# Patient Record
Sex: FEMALE | Race: WHITE | NOT HISPANIC OR LATINO | Employment: FULL TIME | URBAN - METROPOLITAN AREA
[De-identification: names, ages, dates, MRNs, and addresses within clinical notes are randomized per-mention and may not be internally consistent; named-entity substitution may affect disease eponyms.]

---

## 2023-03-08 PROBLEM — F41.1 GENERALIZED ANXIETY DISORDER: Status: ACTIVE | Noted: 2023-03-08

## 2023-03-08 PROBLEM — R53.83 FATIGUE: Status: ACTIVE | Noted: 2023-03-08

## 2023-03-08 PROBLEM — E55.9 VITAMIN D DEFICIENCY: Status: ACTIVE | Noted: 2023-03-08

## 2023-03-16 ENCOUNTER — OFFICE VISIT (OUTPATIENT)
Dept: PRIMARY CARE | Facility: CLINIC | Age: 28
End: 2023-03-16
Payer: COMMERCIAL

## 2023-03-16 VITALS — BODY MASS INDEX: 38.16 KG/M2 | RESPIRATION RATE: 16 BRPM | WEIGHT: 205.3 LBS | TEMPERATURE: 97.9 F | HEART RATE: 80 BPM

## 2023-03-16 DIAGNOSIS — F41.1 GENERALIZED ANXIETY DISORDER: ICD-10-CM

## 2023-03-16 DIAGNOSIS — Z00.00 PHYSICAL EXAM, ANNUAL: Primary | ICD-10-CM

## 2023-03-16 PROCEDURE — 1036F TOBACCO NON-USER: CPT | Performed by: NURSE PRACTITIONER

## 2023-03-16 PROCEDURE — 99395 PREV VISIT EST AGE 18-39: CPT | Performed by: NURSE PRACTITIONER

## 2023-03-16 RX ORDER — SERTRALINE HYDROCHLORIDE 50 MG/1
100 TABLET, FILM COATED ORAL DAILY
Qty: 90 TABLET | Refills: 1
Start: 2023-03-16 | End: 2023-10-25 | Stop reason: SDUPTHER

## 2023-03-16 RX ORDER — NORGESTIMATE AND ETHINYL ESTRADIOL 7DAYSX3 28
1 KIT ORAL DAILY
Qty: 90 TABLET | Refills: 3 | Status: SHIPPED | OUTPATIENT
Start: 2023-03-16 | End: 2023-04-12

## 2023-03-16 NOTE — PROGRESS NOTES
Subjective   Patient ID: Jing Martínez is a 27 y.o. female who presents for Anxiety and Annual Exam.          Well Adult Physical   Patient here for a comprehensive physical exam.The patient reports no problems    Do you take any herbs or supplements that were not prescribed by a doctor? no   Are you taking calcium supplements? no   Are you taking aspirin daily? no     History:  LMP: No LMP recorded.  Last pap date:   Abnormal pap? no  : 0  Para: 0        Anxiety  Presents for follow-up visit. Patient reports no chest pain, decreased concentration, nausea, nervous/anxious behavior, palpitations or shortness of breath. Symptoms occur occasionally. The severity of symptoms is mild. The patient sleeps 5 hours per night. The quality of sleep is good. Nighttime awakenings: none.     Compliance with medications is %.        Review of Systems   Constitutional:  Negative for chills, fatigue and fever.   HENT:  Negative for congestion, ear pain, rhinorrhea, sinus pressure and sore throat.    Eyes:  Negative for pain, discharge and itching.   Respiratory:  Negative for cough, shortness of breath and wheezing.    Cardiovascular:  Negative for chest pain and palpitations.   Gastrointestinal:  Negative for constipation, diarrhea, nausea and vomiting.   Genitourinary:  Negative for difficulty urinating and dysuria.   Musculoskeletal:  Negative for back pain, joint swelling and myalgias.   Skin:  Negative for color change.   Neurological:  Negative for headaches.   Hematological:  Negative for adenopathy.   Psychiatric/Behavioral:  Negative for decreased concentration. The patient is not nervous/anxious.        Objective   Pulse 80   Temp 36.6 °C (97.9 °F)   Resp 16   Wt 93.1 kg (205 lb 4.8 oz)   BMI 38.16 kg/m²     Physical Exam  Constitutional:       General: She is not in acute distress.     Appearance: She is not ill-appearing.   HENT:      Head: Normocephalic and atraumatic.      Mouth/Throat:       Mouth: Mucous membranes are moist.      Pharynx: Oropharynx is clear.   Eyes:      Conjunctiva/sclera: Conjunctivae normal.      Pupils: Pupils are equal, round, and reactive to light.   Cardiovascular:      Rate and Rhythm: Normal rate and regular rhythm.      Pulses: Normal pulses.      Heart sounds: Normal heart sounds.   Pulmonary:      Effort: Pulmonary effort is normal. No respiratory distress.      Breath sounds: Normal breath sounds.   Abdominal:      General: Bowel sounds are normal.      Palpations: Abdomen is soft.      Tenderness: There is no abdominal tenderness.   Musculoskeletal:         General: No tenderness. Normal range of motion.      Cervical back: Normal range of motion and neck supple.   Skin:     General: Skin is warm and dry.   Neurological:      General: No focal deficit present.      Mental Status: She is alert and oriented to person, place, and time.   Psychiatric:         Mood and Affect: Mood normal.         Behavior: Behavior normal.         Thought Content: Thought content normal.         Judgment: Judgment normal.         Assessment/Plan   Problem List Items Addressed This Visit          Other    Generalized anxiety disorder    Relevant Medications    sertraline (Zoloft) 50 mg tablet    Other Relevant Orders    TSH with reflex to Free T4 if abnormal    CBC and Auto Differential    Comprehensive Metabolic Panel     Other Visit Diagnoses       Physical exam, annual    -  Primary    Relevant Medications    Tri Femynor 0.18/0.215/0.25 mg-35 mcg (28) tablet    Other Relevant Orders    Vitamin D, Total    Lipid Panel

## 2023-03-19 ASSESSMENT — ENCOUNTER SYMPTOMS
NAUSEA: 0
SINUS PRESSURE: 0
MYALGIAS: 0
FEVER: 0
CONSTIPATION: 0
RHINORRHEA: 0
WHEEZING: 0
CHILLS: 0
SHORTNESS OF BREATH: 0
SORE THROAT: 0
EYE ITCHING: 0
HEADACHES: 0
DYSURIA: 0
EYE DISCHARGE: 0
COLOR CHANGE: 0
JOINT SWELLING: 0
PALPITATIONS: 0
COUGH: 0
NERVOUS/ANXIOUS: 0
DIFFICULTY URINATING: 0
BACK PAIN: 0
FATIGUE: 0
DIARRHEA: 0
DECREASED CONCENTRATION: 0
VOMITING: 0
ADENOPATHY: 0
EYE PAIN: 0

## 2023-03-20 NOTE — PATIENT INSTRUCTIONS
Patient to continue medications as ordered. Have fasting labs drawn, and we will call with results when available. Follow-up in 1 year, or sooner if needed.

## 2023-04-01 ENCOUNTER — LAB (OUTPATIENT)
Dept: LAB | Facility: LAB | Age: 28
End: 2023-04-01
Payer: COMMERCIAL

## 2023-04-01 DIAGNOSIS — F41.1 GENERALIZED ANXIETY DISORDER: ICD-10-CM

## 2023-04-01 DIAGNOSIS — Z00.00 PHYSICAL EXAM, ANNUAL: ICD-10-CM

## 2023-04-01 LAB
ALANINE AMINOTRANSFERASE (SGPT) (U/L) IN SER/PLAS: 16 U/L (ref 7–45)
ALBUMIN (G/DL) IN SER/PLAS: 4.5 G/DL (ref 3.4–5)
ALKALINE PHOSPHATASE (U/L) IN SER/PLAS: 76 U/L (ref 33–110)
ANION GAP IN SER/PLAS: 14 MMOL/L (ref 10–20)
ASPARTATE AMINOTRANSFERASE (SGOT) (U/L) IN SER/PLAS: 15 U/L (ref 9–39)
BASOPHILS (10*3/UL) IN BLOOD BY AUTOMATED COUNT: 0.03 X10E9/L (ref 0–0.1)
BASOPHILS/100 LEUKOCYTES IN BLOOD BY AUTOMATED COUNT: 0.4 % (ref 0–2)
BILIRUBIN TOTAL (MG/DL) IN SER/PLAS: 0.4 MG/DL (ref 0–1.2)
CALCIDIOL (25 OH VITAMIN D3) (NG/ML) IN SER/PLAS: 32 NG/ML
CALCIUM (MG/DL) IN SER/PLAS: 9.7 MG/DL (ref 8.6–10.6)
CARBON DIOXIDE, TOTAL (MMOL/L) IN SER/PLAS: 26 MMOL/L (ref 21–32)
CHLORIDE (MMOL/L) IN SER/PLAS: 102 MMOL/L (ref 98–107)
CHOLESTEROL (MG/DL) IN SER/PLAS: 276 MG/DL (ref 0–199)
CHOLESTEROL IN HDL (MG/DL) IN SER/PLAS: 87.3 MG/DL
CHOLESTEROL/HDL RATIO: 3.2
CREATININE (MG/DL) IN SER/PLAS: 0.59 MG/DL (ref 0.5–1.05)
EOSINOPHILS (10*3/UL) IN BLOOD BY AUTOMATED COUNT: 0.17 X10E9/L (ref 0–0.7)
EOSINOPHILS/100 LEUKOCYTES IN BLOOD BY AUTOMATED COUNT: 2 % (ref 0–6)
ERYTHROCYTE DISTRIBUTION WIDTH (RATIO) BY AUTOMATED COUNT: 13.8 % (ref 11.5–14.5)
ERYTHROCYTE MEAN CORPUSCULAR HEMOGLOBIN CONCENTRATION (G/DL) BY AUTOMATED: 31.6 G/DL (ref 32–36)
ERYTHROCYTE MEAN CORPUSCULAR VOLUME (FL) BY AUTOMATED COUNT: 80 FL (ref 80–100)
ERYTHROCYTES (10*6/UL) IN BLOOD BY AUTOMATED COUNT: 5.2 X10E12/L (ref 4–5.2)
GFR FEMALE: >90 ML/MIN/1.73M2
GLUCOSE (MG/DL) IN SER/PLAS: 92 MG/DL (ref 74–99)
HEMATOCRIT (%) IN BLOOD BY AUTOMATED COUNT: 41.8 % (ref 36–46)
HEMOGLOBIN (G/DL) IN BLOOD: 13.2 G/DL (ref 12–16)
IMMATURE GRANULOCYTES/100 LEUKOCYTES IN BLOOD BY AUTOMATED COUNT: 0.1 % (ref 0–0.9)
LDL: 171 MG/DL (ref 0–99)
LEUKOCYTES (10*3/UL) IN BLOOD BY AUTOMATED COUNT: 8.4 X10E9/L (ref 4.4–11.3)
LYMPHOCYTES (10*3/UL) IN BLOOD BY AUTOMATED COUNT: 2.11 X10E9/L (ref 1.2–4.8)
LYMPHOCYTES/100 LEUKOCYTES IN BLOOD BY AUTOMATED COUNT: 25.1 % (ref 13–44)
MONOCYTES (10*3/UL) IN BLOOD BY AUTOMATED COUNT: 0.49 X10E9/L (ref 0.1–1)
MONOCYTES/100 LEUKOCYTES IN BLOOD BY AUTOMATED COUNT: 5.8 % (ref 2–10)
NEUTROPHILS (10*3/UL) IN BLOOD BY AUTOMATED COUNT: 5.58 X10E9/L (ref 1.2–7.7)
NEUTROPHILS/100 LEUKOCYTES IN BLOOD BY AUTOMATED COUNT: 66.6 % (ref 40–80)
NRBC (PER 100 WBCS) BY AUTOMATED COUNT: 0 /100 WBC (ref 0–0)
PLATELETS (10*3/UL) IN BLOOD AUTOMATED COUNT: 398 X10E9/L (ref 150–450)
POTASSIUM (MMOL/L) IN SER/PLAS: 4.3 MMOL/L (ref 3.5–5.3)
PROTEIN TOTAL: 7.1 G/DL (ref 6.4–8.2)
SODIUM (MMOL/L) IN SER/PLAS: 138 MMOL/L (ref 136–145)
THYROTROPIN (MIU/L) IN SER/PLAS BY DETECTION LIMIT <= 0.05 MIU/L: 2.18 MIU/L (ref 0.44–3.98)
TRIGLYCERIDE (MG/DL) IN SER/PLAS: 88 MG/DL (ref 0–149)
UREA NITROGEN (MG/DL) IN SER/PLAS: 11 MG/DL (ref 6–23)
VLDL: 18 MG/DL (ref 0–40)

## 2023-04-01 PROCEDURE — 80053 COMPREHEN METABOLIC PANEL: CPT

## 2023-04-01 PROCEDURE — 36415 COLL VENOUS BLD VENIPUNCTURE: CPT

## 2023-04-01 PROCEDURE — 85025 COMPLETE CBC W/AUTO DIFF WBC: CPT

## 2023-04-01 PROCEDURE — 84443 ASSAY THYROID STIM HORMONE: CPT

## 2023-04-01 PROCEDURE — 82306 VITAMIN D 25 HYDROXY: CPT

## 2023-04-01 PROCEDURE — 80061 LIPID PANEL: CPT

## 2023-04-03 DIAGNOSIS — E78.2 MIXED HYPERLIPIDEMIA: Primary | ICD-10-CM

## 2023-04-11 DIAGNOSIS — Z00.00 PHYSICAL EXAM, ANNUAL: ICD-10-CM

## 2023-04-12 RX ORDER — NORGESTIMATE AND ETHINYL ESTRADIOL 7DAYSX3 28
KIT ORAL
Qty: 28 TABLET | Refills: 11 | Status: SHIPPED | OUTPATIENT
Start: 2023-04-12 | End: 2023-08-15 | Stop reason: SDUPTHER

## 2023-08-15 DIAGNOSIS — Z00.00 PHYSICAL EXAM, ANNUAL: ICD-10-CM

## 2023-08-16 RX ORDER — NORGESTIMATE AND ETHINYL ESTRADIOL 7DAYSX3 28
1 KIT ORAL DAILY
Qty: 28 TABLET | Refills: 11 | Status: SHIPPED | OUTPATIENT
Start: 2023-08-16 | End: 2023-10-25 | Stop reason: SDUPTHER

## 2023-09-02 RX ORDER — SERTRALINE HYDROCHLORIDE 100 MG/1
1 TABLET, FILM COATED ORAL DAILY
COMMUNITY
Start: 2020-05-28 | End: 2024-01-08 | Stop reason: ALTCHOICE

## 2023-10-10 ENCOUNTER — TELEMEDICINE (OUTPATIENT)
Dept: BEHAVIORAL HEALTH | Facility: CLINIC | Age: 28
End: 2023-10-10
Payer: COMMERCIAL

## 2023-10-10 DIAGNOSIS — F41.1 GAD (GENERALIZED ANXIETY DISORDER): ICD-10-CM

## 2023-10-10 PROCEDURE — 90837 PSYTX W PT 60 MINUTES: CPT | Performed by: PSYCHOLOGIST

## 2023-10-10 NOTE — PROGRESS NOTES
Start time: 4:00p  End time: 4:55p      The patient was informed of the current need to conduct treatment via telephone or telehealth due to Covid-19 pandemic. Patient consented to the use of the platform that may not be HIPAA compliant. I have confirmed the patient's identity via the following (minimum of three) acceptable identifiers as per  Policy PH-9:   1. Last 4 of social:   2. :   3. Address:    Telephone/Televideo Informed Consent for Psychotherapy was reviewed with the patient as follows:  There are potential benefits and risks of the use of telephone or video-conferencing that differ from in-person sessions. Specifically, the telephone or televideo system we are using may not be HIPAA compliant and may present limits to patient confidentiality. Confidentiality still applies for telepsychology services, and nobody will record the session without your permission. You agree to use the telephone or video-conferencing platform selected for our virtual sessions, and I will explain how to use it.  1)             You need to use a webcam or smartphone during the session.  2)             It is important that you be in a quiet, private space that is free of distractions (including cell phone or other devices) during the session.  3)             It is important to use a secure internet connection rather than public/free Wi-Fi.  4)             It is important to be on time. If you need to cancel or change your tele-appointment, you must notify the psychologist in advance by phone or email.  5)             We need a back-up plan (e.g., phone number where you can be reached) to restart the session or to reschedule it, in the event of technical problems.  6)             We need a safety plan that includes at least one emergency contact and the closest emergency room to your location, in the event of a crisis situation.  7)             If you are not an adult, we need the permission of your parent or legal guardian  (and their contact information) for you to participate in telepsychology sessions.  Understanding and verbal agreement was attested to by the patient.      Reason for care: Anxiety  Method of therapy: Supportive  Therapy summary: Pt reported that she is planning and preparing for her wedding this weekend. She feels she is doing well with her responsibilities but is struggling with the things that are outside of her control. She processed relationship dynamics with her MOH. She detailed how their dynamics have changed over the last two years and how this is impacting her anxiety around the wedding.     She stated that her eye twitching has reduced.     She continued to process relationship dynamics within her partner's family as well and how this will impact her wedding day.     She denies SI/HI/AVH. We will follow-up in one month.   Identified goals/objectives: Stress management for anxiety around upcoming wedding and recent dynamics  Response to therapy: Engaged  Treatment plan and process: Continue with above stated tx goals; Process family and relationship dynamics; [place] setting

## 2023-10-25 DIAGNOSIS — Z00.00 PHYSICAL EXAM, ANNUAL: ICD-10-CM

## 2023-10-25 DIAGNOSIS — F41.1 GENERALIZED ANXIETY DISORDER: ICD-10-CM

## 2023-10-26 RX ORDER — SERTRALINE HYDROCHLORIDE 50 MG/1
100 TABLET, FILM COATED ORAL DAILY
Qty: 90 TABLET | Refills: 1 | Status: SHIPPED | OUTPATIENT
Start: 2023-10-26 | End: 2023-12-07

## 2023-10-26 RX ORDER — NORGESTIMATE AND ETHINYL ESTRADIOL 7DAYSX3 28
1 KIT ORAL DAILY
Qty: 84 TABLET | Refills: 3 | Status: SHIPPED | OUTPATIENT
Start: 2023-10-26

## 2023-11-13 ENCOUNTER — TELEMEDICINE (OUTPATIENT)
Dept: BEHAVIORAL HEALTH | Facility: CLINIC | Age: 28
End: 2023-11-13
Payer: COMMERCIAL

## 2023-11-13 DIAGNOSIS — F41.1 GAD (GENERALIZED ANXIETY DISORDER): ICD-10-CM

## 2023-11-13 PROCEDURE — 90837 PSYTX W PT 60 MINUTES: CPT | Performed by: PSYCHOLOGIST

## 2023-11-13 NOTE — PROGRESS NOTES
Start time: 4:00p  End time: 4:54p      The patient was informed of the current need to conduct treatment via telephone or telehealth due to Covid-19 pandemic. Patient consented to the use of the platform that may not be HIPAA compliant. I have confirmed the patient's identity via the following (minimum of three) acceptable identifiers as per  Policy PH-9:   1. Last 4 of social  2.   3. Address    Telephone/Televideo Informed Consent for Psychotherapy was reviewed with the patient as follows:  There are potential benefits and risks of the use of telephone or video-conferencing that differ from in-person sessions. Specifically, the telephone or televideo system we are using may not be HIPAA compliant and may present limits to patient confidentiality. Confidentiality still applies for telepsychology services, and nobody will record the session without your permission. You agree to use the telephone or video-conferencing platform selected for our virtual sessions, and I will explain how to use it.  1)             You need to use a webcam or smartphone during the session.  2)             It is important that you be in a quiet, private space that is free of distractions (including cell phone or other devices) during the session.  3)             It is important to use a secure internet connection rather than public/free Wi-Fi.  4)             It is important to be on time. If you need to cancel or change your tele-appointment, you must notify the psychologist in advance by phone or email.  5)             We need a back-up plan (e.g., phone number where you can be reached) to restart the session or to reschedule it, in the event of technical problems.  6)             We need a safety plan that includes at least one emergency contact and the closest emergency room to your location, in the event of a crisis situation.  7)             If you are not an adult, we need the permission of your parent or legal guardian (and  their contact information) for you to participate in telepsychology sessions.  Understanding and verbal agreement was attested to by the patient.      Reason for care: Anxiety  Method of therapy: Supportive  Therapy summary: Jing reported that she is feeling tired. She was off school due to sickness recently. She reported that she just finished the school musical and is feeling relieved that it is over. She is feeling that things are settling down at school. She discussed things that are happening at home (e.g., adopting a cat, partner's promotion).     She processed her wedding and how dynamics were between her and others. She stated that overall it was a beautiful day.     Goals: find ; talk with partner about [place] and family dynamic    She denies SI/HI/AVH. We will follow-up in one month.   Identified goals/objectives: Maintain stable mood and low anxiety through anxiety management; practice self-care   Response to therapy: Engaged  Treatment plan and process: Continue with above stated tx goals; Process family and relationship dynamics; [place] setting; continued review of anxiety management

## 2023-12-06 DIAGNOSIS — F41.1 GENERALIZED ANXIETY DISORDER: ICD-10-CM

## 2023-12-07 RX ORDER — SERTRALINE HYDROCHLORIDE 50 MG/1
100 TABLET, FILM COATED ORAL DAILY
Qty: 180 TABLET | Refills: 1 | Status: SHIPPED | OUTPATIENT
Start: 2023-12-07 | End: 2024-01-08 | Stop reason: SDUPTHER

## 2023-12-13 ENCOUNTER — TELEMEDICINE (OUTPATIENT)
Dept: BEHAVIORAL HEALTH | Facility: CLINIC | Age: 28
End: 2023-12-13
Payer: COMMERCIAL

## 2023-12-13 DIAGNOSIS — F41.1 GAD (GENERALIZED ANXIETY DISORDER): ICD-10-CM

## 2023-12-13 PROCEDURE — 90834 PSYTX W PT 45 MINUTES: CPT | Mod: AH,95 | Performed by: PSYCHOLOGIST

## 2023-12-13 NOTE — PROGRESS NOTES
Start time: 3:40p  End time: 4:21p      The patient was informed of the current need to conduct treatment via telephone or telehealth due to Covid-19 pandemic. Patient consented to the use of the platform that may not be HIPAA compliant. I have confirmed the patient's identity via the following (minimum of three) acceptable identifiers as per  Policy PH-9:   1. Last 4 of social  2.   3. Address    Telephone/Televideo Informed Consent for Psychotherapy was reviewed with the patient as follows:  There are potential benefits and risks of the use of telephone or video-conferencing that differ from in-person sessions. Specifically, the telephone or televideo system we are using may not be HIPAA compliant and may present limits to patient confidentiality. Confidentiality still applies for telepsychology services, and nobody will record the session without your permission. You agree to use the telephone or video-conferencing platform selected for our virtual sessions, and I will explain how to use it.  1)             You need to use a webcam or smartphone during the session.  2)             It is important that you be in a quiet, private space that is free of distractions (including cell phone or other devices) during the session.  3)             It is important to use a secure internet connection rather than public/free Wi-Fi.  4)             It is important to be on time. If you need to cancel or change your tele-appointment, you must notify the psychologist in advance by phone or email.  5)             We need a back-up plan (e.g., phone number where you can be reached) to restart the session or to reschedule it, in the event of technical problems.  6)             We need a safety plan that includes at least one emergency contact and the closest emergency room to your location, in the event of a crisis situation.  7)             If you are not an adult, we need the permission of your parent or legal guardian (and  their contact information) for you to participate in telepsychology sessions.  Understanding and verbal agreement was attested to by the patient.      Reason for care: Anxiety  Method of therapy: Supportive  Therapy summary: Jing processed recent stressors since our last appointment. She stated that her eye is still twitching and she has an appt with her PCP. It is not happening all day. She described it as twitching at least once per day for a few minutes. She discussed possibility of this being stress related and we reviewed stress management skills.     She discussed additional stressors related to work and home.     Goals: Increase physical activity for stress management; practice self-care; talk with partner about [place] and family dynamic    She denies SI/HI/AVH. We will follow-up in one month.   Identified goals/objectives: Maintain stable mood and low anxiety through anxiety management; practice self-care   Response to therapy: Engaged  Treatment plan and process: Continue with above stated tx goals; Process family and relationship dynamics; [place] setting; continued review of anxiety management

## 2024-01-08 ENCOUNTER — OFFICE VISIT (OUTPATIENT)
Dept: PRIMARY CARE | Facility: CLINIC | Age: 29
End: 2024-01-08
Payer: COMMERCIAL

## 2024-01-08 VITALS
HEIGHT: 62 IN | WEIGHT: 204 LBS | DIASTOLIC BLOOD PRESSURE: 72 MMHG | OXYGEN SATURATION: 100 % | HEART RATE: 100 BPM | SYSTOLIC BLOOD PRESSURE: 126 MMHG | RESPIRATION RATE: 14 BRPM | BODY MASS INDEX: 37.54 KG/M2

## 2024-01-08 DIAGNOSIS — R25.3 EYELID TWITCH: Primary | ICD-10-CM

## 2024-01-08 DIAGNOSIS — F41.1 GENERALIZED ANXIETY DISORDER: ICD-10-CM

## 2024-01-08 PROCEDURE — 1036F TOBACCO NON-USER: CPT | Performed by: NURSE PRACTITIONER

## 2024-01-08 PROCEDURE — 99213 OFFICE O/P EST LOW 20 MIN: CPT | Performed by: NURSE PRACTITIONER

## 2024-01-08 RX ORDER — SERTRALINE HYDROCHLORIDE 50 MG/1
50 TABLET, FILM COATED ORAL DAILY
Qty: 90 TABLET | Refills: 3 | Status: SHIPPED | OUTPATIENT
Start: 2024-01-08 | End: 2025-01-07

## 2024-01-08 ASSESSMENT — COLUMBIA-SUICIDE SEVERITY RATING SCALE - C-SSRS
1. IN THE PAST MONTH, HAVE YOU WISHED YOU WERE DEAD OR WISHED YOU COULD GO TO SLEEP AND NOT WAKE UP?: NO
2. HAVE YOU ACTUALLY HAD ANY THOUGHTS OF KILLING YOURSELF?: NO
6. HAVE YOU EVER DONE ANYTHING, STARTED TO DO ANYTHING, OR PREPARED TO DO ANYTHING TO END YOUR LIFE?: NO

## 2024-01-08 ASSESSMENT — PATIENT HEALTH QUESTIONNAIRE - PHQ9
1. LITTLE INTEREST OR PLEASURE IN DOING THINGS: SEVERAL DAYS
SUM OF ALL RESPONSES TO PHQ9 QUESTIONS 1 AND 2: 2
2. FEELING DOWN, DEPRESSED OR HOPELESS: SEVERAL DAYS

## 2024-01-08 NOTE — PROGRESS NOTES
"Subjective   Jing Morrison is a 28 y.o. female who presents for new concern   Left  Eye has been twitching since Oct went away in dec and came back multiple times a day   Twitching more when stressed     She is up to date with the eye doctor (last summer), and she is working with her counselor to decrease her stress level.    Eye Problem   Both eyes are affected. This is a new problem. The current episode started more than 1 month ago. The problem occurs 2 to 4 times per day. The problem has been gradually improving. There was no injury mechanism. The patient is experiencing no pain. Pertinent negatives include no eye discharge, fever, itching, nausea or vomiting. She has tried nothing for the symptoms.        Review of Systems   Constitutional:  Negative for chills, fatigue and fever.   HENT:  Negative for congestion, ear pain, rhinorrhea, sinus pressure and sore throat.    Eyes:  Negative for pain, discharge and itching.        Twitching   Respiratory:  Negative for cough, shortness of breath and wheezing.    Cardiovascular:  Negative for chest pain and palpitations.   Gastrointestinal:  Negative for constipation, diarrhea, nausea and vomiting.   Genitourinary:  Negative for difficulty urinating and dysuria.   Musculoskeletal:  Negative for back pain, joint swelling and myalgias.   Skin:  Negative for color change.   Neurological:  Negative for headaches.   Hematological:  Negative for adenopathy.   Psychiatric/Behavioral:  Negative for decreased concentration. The patient is not nervous/anxious.        Objective   /72 (BP Location: Right arm, Patient Position: Sitting)   Pulse 100   Resp 14   Ht 1.562 m (5' 1.5\")   Wt 92.5 kg (204 lb)   SpO2 100%   BMI 37.92 kg/m²     Physical Exam  Constitutional:       General: She is not in acute distress.     Appearance: She is not ill-appearing.   HENT:      Head: Normocephalic and atraumatic.      Right Ear: Tympanic membrane, ear canal and external ear normal. "      Left Ear: Tympanic membrane and ear canal normal.      Nose: Nose normal.      Mouth/Throat:      Mouth: Mucous membranes are moist.      Pharynx: Oropharynx is clear.   Eyes:      Extraocular Movements: Extraocular movements intact.      Conjunctiva/sclera: Conjunctivae normal.      Pupils: Pupils are equal, round, and reactive to light.   Cardiovascular:      Rate and Rhythm: Normal rate and regular rhythm.      Pulses: Normal pulses.      Heart sounds: Normal heart sounds.   Pulmonary:      Effort: Pulmonary effort is normal. No respiratory distress.      Breath sounds: Normal breath sounds.   Abdominal:      General: Bowel sounds are normal.      Palpations: Abdomen is soft.      Tenderness: There is no abdominal tenderness.   Musculoskeletal:         General: Normal range of motion.   Skin:     General: Skin is warm and dry.   Neurological:      General: No focal deficit present.      Mental Status: She is alert and oriented to person, place, and time.   Psychiatric:         Mood and Affect: Mood normal.         Behavior: Behavior normal.         Thought Content: Thought content normal.         Judgment: Judgment normal.         Assessment/Plan   Problem List Items Addressed This Visit       Generalized anxiety disorder    Relevant Medications    sertraline (Zoloft) 50 mg tablet    Other Relevant Orders    Vitamin B12 (Completed)    Magnesium (Completed)    Comprehensive Metabolic Panel (Completed)     Other Visit Diagnoses       Eyelid twitch    -  Primary    Relevant Orders    Vitamin B12 (Completed)    Magnesium (Completed)    Comprehensive Metabolic Panel (Completed)          Patient Instructions   Patient to continue medications as ordered. Have fasting labs drawn, and we will call with results when available. Encouraged to see the eye doctor if symptoms don't continue to improve. Follow-up for physical, and call the office if any problems or concerns in the meantime.

## 2024-01-13 ENCOUNTER — LAB (OUTPATIENT)
Dept: LAB | Facility: LAB | Age: 29
End: 2024-01-13
Payer: COMMERCIAL

## 2024-01-13 DIAGNOSIS — E78.2 MIXED HYPERLIPIDEMIA: ICD-10-CM

## 2024-01-13 DIAGNOSIS — F41.1 GENERALIZED ANXIETY DISORDER: ICD-10-CM

## 2024-01-13 DIAGNOSIS — R25.3 EYELID TWITCH: ICD-10-CM

## 2024-01-13 LAB
ALBUMIN SERPL-MCNC: 4.4 G/DL (ref 3.5–5)
ALP BLD-CCNC: 81 U/L (ref 35–125)
ALT SERPL-CCNC: 12 U/L (ref 5–40)
ANION GAP SERPL CALC-SCNC: 14 MMOL/L
AST SERPL-CCNC: 12 U/L (ref 5–40)
BILIRUB SERPL-MCNC: <0.2 MG/DL (ref 0.1–1.2)
BUN SERPL-MCNC: 11 MG/DL (ref 8–25)
CALCIUM SERPL-MCNC: 9.5 MG/DL (ref 8.5–10.4)
CHLORIDE SERPL-SCNC: 102 MMOL/L (ref 97–107)
CHOLEST SERPL-MCNC: 312 MG/DL (ref 133–200)
CHOLEST/HDLC SERPL: 2.6 {RATIO}
CO2 SERPL-SCNC: 25 MMOL/L (ref 24–31)
CREAT SERPL-MCNC: 0.6 MG/DL (ref 0.4–1.6)
EGFRCR SERPLBLD CKD-EPI 2021: >90 ML/MIN/1.73M*2
GLUCOSE SERPL-MCNC: 89 MG/DL (ref 65–99)
HDLC SERPL-MCNC: 121 MG/DL
LDLC SERPL CALC-MCNC: 176 MG/DL (ref 65–130)
MAGNESIUM SERPL-MCNC: 2.1 MG/DL (ref 1.6–3.1)
POTASSIUM SERPL-SCNC: 4.5 MMOL/L (ref 3.4–5.1)
PROT SERPL-MCNC: 7 G/DL (ref 5.9–7.9)
SODIUM SERPL-SCNC: 141 MMOL/L (ref 133–145)
TRIGL SERPL-MCNC: 77 MG/DL (ref 40–150)
VIT B12 SERPL-MCNC: 261 PG/ML (ref 211–946)

## 2024-01-13 PROCEDURE — 80053 COMPREHEN METABOLIC PANEL: CPT

## 2024-01-13 PROCEDURE — 83735 ASSAY OF MAGNESIUM: CPT

## 2024-01-13 PROCEDURE — 36415 COLL VENOUS BLD VENIPUNCTURE: CPT

## 2024-01-13 PROCEDURE — 80061 LIPID PANEL: CPT

## 2024-01-13 PROCEDURE — 82607 VITAMIN B-12: CPT

## 2024-01-22 RX ORDER — LEVOCETIRIZINE DIHYDROCHLORIDE 5 MG/1
5 TABLET, FILM COATED ORAL EVERY EVENING
COMMUNITY
Start: 2023-11-02 | End: 2024-11-01

## 2024-01-22 ASSESSMENT — ENCOUNTER SYMPTOMS
COUGH: 0
FATIGUE: 0
JOINT SWELLING: 0
PALPITATIONS: 0
MYALGIAS: 0
CONSTIPATION: 0
VOMITING: 0
NAUSEA: 0
DYSURIA: 0
SINUS PRESSURE: 0
WHEEZING: 0
ADENOPATHY: 0
DIARRHEA: 0
HEADACHES: 0
DIFFICULTY URINATING: 0
SHORTNESS OF BREATH: 0
SORE THROAT: 0
NERVOUS/ANXIOUS: 0
CHILLS: 0
DECREASED CONCENTRATION: 0
EYE PAIN: 0
EYE DISCHARGE: 0
COLOR CHANGE: 0
BACK PAIN: 0
EYE ITCHING: 0
RHINORRHEA: 0
FEVER: 0

## 2024-01-22 NOTE — PATIENT INSTRUCTIONS
Patient to continue medications as ordered. Have fasting labs drawn, and we will call with results when available. Encouraged to see the eye doctor if symptoms don't continue to improve. Follow-up for physical, and call the office if any problems or concerns in the meantime.

## 2024-01-31 ENCOUNTER — TELEMEDICINE (OUTPATIENT)
Dept: BEHAVIORAL HEALTH | Facility: CLINIC | Age: 29
End: 2024-01-31
Payer: COMMERCIAL

## 2024-01-31 DIAGNOSIS — F41.1 GAD (GENERALIZED ANXIETY DISORDER): ICD-10-CM

## 2024-01-31 PROCEDURE — 90834 PSYTX W PT 45 MINUTES: CPT | Mod: AH,95 | Performed by: PSYCHOLOGIST

## 2024-01-31 NOTE — PROGRESS NOTES
Start time: 4:31pm  End time: 5:15pm      The patient was informed of the current need to conduct treatment via telephone or telehealth due to Covid-19 pandemic. Patient consented to the use of the platform that may not be HIPAA compliant. I have confirmed the patient's identity via the following (minimum of three) acceptable identifiers as per  Policy PH-9:   1. Last 4 of social  2.   3. Address    Telephone/Televideo Informed Consent for Psychotherapy was reviewed with the patient as follows:  There are potential benefits and risks of the use of telephone or video-conferencing that differ from in-person sessions. Specifically, the telephone or televideo system we are using may not be HIPAA compliant and may present limits to patient confidentiality. Confidentiality still applies for telepsychology services, and nobody will record the session without your permission. You agree to use the telephone or video-conferencing platform selected for our virtual sessions, and I will explain how to use it.  1)             You need to use a webcam or smartphone during the session.  2)             It is important that you be in a quiet, private space that is free of distractions (including cell phone or other devices) during the session.  3)             It is important to use a secure internet connection rather than public/free Wi-Fi.  4)             It is important to be on time. If you need to cancel or change your tele-appointment, you must notify the psychologist in advance by phone or email.  5)             We need a back-up plan (e.g., phone number where you can be reached) to restart the session or to reschedule it, in the event of technical problems.  6)             We need a safety plan that includes at least one emergency contact and the closest emergency room to your location, in the event of a crisis situation.  7)             If you are not an adult, we need the permission of your parent or legal guardian (and  their contact information) for you to participate in telepsychology sessions.  Understanding and verbal agreement was attested to by the patient.      Reason for care: Anxiety  Method of therapy: Supportive  Therapy summary: Jing processed feeling exhausted due to recent stressors. She discussed work related issues that she has been navigating. She stated that she has been cooking more, listening to calming music, spending time with family and crocheting as well as  work and home.     She processed family planning and worries for the future. We reviewed how her stress management techniques can be used for these fears and ways to help her explore readiness.     She denies SI/HI/AVH. We will follow-up in one month.   Identified goals/objectives: Identify expectations for family planning and parenthood; utilize anxiety management strategies.   Response to therapy: Engaged  Treatment plan and process: Continue with above stated tx goals; Process family and relationship dynamics; [place] setting; continued review of anxiety management

## 2024-02-28 ENCOUNTER — TELEMEDICINE (OUTPATIENT)
Dept: BEHAVIORAL HEALTH | Facility: CLINIC | Age: 29
End: 2024-02-28
Payer: COMMERCIAL

## 2024-02-28 DIAGNOSIS — F41.1 GAD (GENERALIZED ANXIETY DISORDER): ICD-10-CM

## 2024-02-28 PROCEDURE — 90837 PSYTX W PT 60 MINUTES: CPT | Mod: AH,95 | Performed by: PSYCHOLOGIST

## 2024-02-28 PROCEDURE — 1036F TOBACCO NON-USER: CPT | Performed by: PSYCHOLOGIST

## 2024-02-28 NOTE — PROGRESS NOTES
Start time: 4:31pm  End time: 5:25pm      The patient was informed of the current need to conduct treatment via telephone or telehealth due to Covid-19 pandemic. Patient consented to the use of the platform that may not be HIPAA compliant. I have confirmed the patient's identity via the following (minimum of three) acceptable identifiers as per  Policy PH-9:   1. Last 4 of social  2.   3. Address    Telephone/Televideo Informed Consent for Psychotherapy was reviewed with the patient as follows:  There are potential benefits and risks of the use of telephone or video-conferencing that differ from in-person sessions. Specifically, the telephone or televideo system we are using may not be HIPAA compliant and may present limits to patient confidentiality. Confidentiality still applies for telepsychology services, and nobody will record the session without your permission. You agree to use the telephone or video-conferencing platform selected for our virtual sessions, and I will explain how to use it.  1)             You need to use a webcam or smartphone during the session.  2)             It is important that you be in a quiet, private space that is free of distractions (including cell phone or other devices) during the session.  3)             It is important to use a secure internet connection rather than public/free Wi-Fi.  4)             It is important to be on time. If you need to cancel or change your tele-appointment, you must notify the psychologist in advance by phone or email.  5)             We need a back-up plan (e.g., phone number where you can be reached) to restart the session or to reschedule it, in the event of technical problems.  6)             We need a safety plan that includes at least one emergency contact and the closest emergency room to your location, in the event of a crisis situation.  7)             If you are not an adult, we need the permission of your parent or legal guardian (and  "their contact information) for you to participate in telepsychology sessions.  Understanding and verbal agreement was attested to by the patient.    Jing has requested that her notes be blocked at this time.     Reason for care: Anxiety  Method of therapy: Supportive  Therapy summary: Jing stated that she has been feeling \"high highs and low lows.\" She processed recent challenges. She discussed recent issues with managing high stress moments at school. She shared what she has been doing for stress management.     She continued to process family planning and emotions this evokes. She discussed how recent experiences are impacting her frustration tolerance.    She was tearful through session and identified ways to regulate her emotion and cope with irritability.     She denies SI/HI/AVH. We will follow-up in two weeks.   Identified goals/objectives: Finding activities that bring ravi and relief from stress; Identify expectations for family planning and parenthood; utilize anxiety management strategies.   Response to therapy: Engaged  Treatment plan and process: Continue with above stated tx goals; Process family and relationship dynamics; [place] setting; continued review of anxiety management   "

## 2024-03-13 ENCOUNTER — TELEMEDICINE (OUTPATIENT)
Dept: BEHAVIORAL HEALTH | Facility: CLINIC | Age: 29
End: 2024-03-13
Payer: COMMERCIAL

## 2024-03-13 DIAGNOSIS — F41.1 GAD (GENERALIZED ANXIETY DISORDER): ICD-10-CM

## 2024-03-13 PROCEDURE — 90834 PSYTX W PT 45 MINUTES: CPT | Performed by: PSYCHOLOGIST

## 2024-03-13 PROCEDURE — 1036F TOBACCO NON-USER: CPT | Performed by: PSYCHOLOGIST

## 2024-03-13 NOTE — PROGRESS NOTES
Start time: 4:32pm  End time: 5:20pm      The patient was informed of the current need to conduct treatment via telephone or telehealth due to Covid-19 pandemic. Patient consented to the use of the platform that may not be HIPAA compliant. I have confirmed the patient's identity via the following (minimum of three) acceptable identifiers as per  Policy PH-9:   1. Last 4 of social  2.   3. Address    Telephone/Televideo Informed Consent for Psychotherapy was reviewed with the patient as follows:  There are potential benefits and risks of the use of telephone or video-conferencing that differ from in-person sessions. Specifically, the telephone or televideo system we are using may not be HIPAA compliant and may present limits to patient confidentiality. Confidentiality still applies for telepsychology services, and nobody will record the session without your permission. You agree to use the telephone or video-conferencing platform selected for our virtual sessions, and I will explain how to use it.  1)             You need to use a webcam or smartphone during the session.  2)             It is important that you be in a quiet, private space that is free of distractions (including cell phone or other devices) during the session.  3)             It is important to use a secure internet connection rather than public/free Wi-Fi.  4)             It is important to be on time. If you need to cancel or change your tele-appointment, you must notify the psychologist in advance by phone or email.  5)             We need a back-up plan (e.g., phone number where you can be reached) to restart the session or to reschedule it, in the event of technical problems.  6)             We need a safety plan that includes at least one emergency contact and the closest emergency room to your location, in the event of a crisis situation.  7)             If you are not an adult, we need the permission of your parent or legal guardian (and  "their contact information) for you to participate in telepsychology sessions.  Understanding and verbal agreement was attested to by the patient.    Jing has requested that her notes be blocked at this time.     Reason for care: Anxiety  Method of therapy: Supportive  Therapy summary: Jing reported that her mood has improved somewhat since our last session. She stated that she has \"done fun things\" and noted there have been other changes that have likely contributed to this. She discussed how she has been  coping with work stressors.     She reported that she had an [employer] party and enjoyed this very much. She stated that she was also able to spend quality time with a friend. She continued to process readiness related to starting a family.     She denies SI/HI/AVH. We will follow-up in three weeks.   Identified goals/objectives: continue to engage in activities that bring ravi and relief from stress; Identify expectations for family planning and parenthood; utilize anxiety management strategies.   Response to therapy: Engaged  Treatment plan and process: Continue with above stated tx goals; Process family and relationship dynamics; [place] setting; continued review of anxiety management   "

## 2024-04-15 ENCOUNTER — TELEMEDICINE (OUTPATIENT)
Dept: BEHAVIORAL HEALTH | Facility: CLINIC | Age: 29
End: 2024-04-15
Payer: COMMERCIAL

## 2024-04-15 DIAGNOSIS — F41.1 GAD (GENERALIZED ANXIETY DISORDER): ICD-10-CM

## 2024-04-15 PROCEDURE — 90834 PSYTX W PT 45 MINUTES: CPT | Performed by: PSYCHOLOGIST

## 2024-04-15 NOTE — PROGRESS NOTES
Start time: 4:01pm  End time: 4:45pm      The patient was informed of the current need to conduct treatment via telephone or telehealth due to Covid-19 pandemic. Patient consented to the use of the platform that may not be HIPAA compliant. I have confirmed the patient's identity via the following (minimum of three) acceptable identifiers as per  Policy PH-9:   1. Last 4 of social  2.   3. Address    Telephone/Televideo Informed Consent for Psychotherapy was reviewed with the patient as follows:  There are potential benefits and risks of the use of telephone or video-conferencing that differ from in-person sessions. Specifically, the telephone or televideo system we are using may not be HIPAA compliant and may present limits to patient confidentiality. Confidentiality still applies for telepsychology services, and nobody will record the session without your permission. You agree to use the telephone or video-conferencing platform selected for our virtual sessions, and I will explain how to use it.  1)             You need to use a webcam or smartphone during the session.  2)             It is important that you be in a quiet, private space that is free of distractions (including cell phone or other devices) during the session.  3)             It is important to use a secure internet connection rather than public/free Wi-Fi.  4)             It is important to be on time. If you need to cancel or change your tele-appointment, you must notify the psychologist in advance by phone or email.  5)             We need a back-up plan (e.g., phone number where you can be reached) to restart the session or to reschedule it, in the event of technical problems.  6)             We need a safety plan that includes at least one emergency contact and the closest emergency room to your location, in the event of a crisis situation.  7)             If you are not an adult, we need the permission of your parent or legal guardian (and  "their contact information) for you to participate in telepsychology sessions.  Understanding and verbal agreement was attested to by the patient.    Jing has requested that her notes be blocked at this time.     Reason for care: Anxiety  Method of therapy: Supportive  Therapy summary: Jing processed her honeymoon. She described it as \"an amazing experience.\" She stated that she faced a fear by snorkeling with manta rays.     She stated that she and her  have decided to start fostering a dog. She discussed what it has been like to have a dog in the home.     She had her observation at work today and feels it went well. She feels her mood has improved since our last session and her work stress has decreased. She has been preparing for the end of the year and is no longer a  at her school.     She processed changing relationship dynamics and coming to acceptance. We explored grief and loss as well as relief she feels related to these relationships.    Overall, she reported stable mood and low anxiety.     She denies SI/HI/AVH. We will follow-up in one month.   Identified goals/objectives: continue to engage in activities that bring ravi and relief from stress; Identify expectations for family planning and parenthood; utilize anxiety management strategies.   Response to therapy: Engaged  Treatment plan and process: Continue with above stated tx goals; Process family and relationship dynamics; [place] setting; continued review of anxiety management   "

## 2024-05-13 ENCOUNTER — TELEMEDICINE (OUTPATIENT)
Dept: BEHAVIORAL HEALTH | Facility: CLINIC | Age: 29
End: 2024-05-13
Payer: COMMERCIAL

## 2024-05-13 DIAGNOSIS — F41.1 GAD (GENERALIZED ANXIETY DISORDER): ICD-10-CM

## 2024-05-13 PROCEDURE — 90834 PSYTX W PT 45 MINUTES: CPT | Performed by: PSYCHOLOGIST

## 2024-05-13 NOTE — PROGRESS NOTES
Start time: 4:02pm  End time: 4:50pm      The patient was informed of the current need to conduct treatment via telephone or telehealth due to Covid-19 pandemic. Patient consented to the use of the platform that may not be HIPAA compliant. I have confirmed the patient's identity via the following (minimum of three) acceptable identifiers as per  Policy PH-9:   1. Last 4 of social  2.   3. Address    Telephone/Televideo Informed Consent for Psychotherapy was reviewed with the patient as follows:  There are potential benefits and risks of the use of telephone or video-conferencing that differ from in-person sessions. Specifically, the telephone or televideo system we are using may not be HIPAA compliant and may present limits to patient confidentiality. Confidentiality still applies for telepsychology services, and nobody will record the session without your permission. You agree to use the telephone or video-conferencing platform selected for our virtual sessions, and I will explain how to use it.  1)             You need to use a webcam or smartphone during the session.  2)             It is important that you be in a quiet, private space that is free of distractions (including cell phone or other devices) during the session.  3)             It is important to use a secure internet connection rather than public/free Wi-Fi.  4)             It is important to be on time. If you need to cancel or change your tele-appointment, you must notify the psychologist in advance by phone or email.  5)             We need a back-up plan (e.g., phone number where you can be reached) to restart the session or to reschedule it, in the event of technical problems.  6)             We need a safety plan that includes at least one emergency contact and the closest emergency room to your location, in the event of a crisis situation.  7)             If you are not an adult, we need the permission of your parent or legal guardian (and  their contact information) for you to participate in telepsychology sessions.  Understanding and verbal agreement was attested to by the patient.    Jing has requested that her notes be blocked at this time.     Reason for care: Anxiety  Method of therapy: Supportive  Therapy summary: Jing reported that she took her last personal day today and had a more relaxed day. She processed getting a new puppy over the weekend and the sadness she experienced with things not working out with their foster animal. She stated that the foster dog was anxious and did not want to be around her and her partner.     She reported that she has not had a lot of support from family about adopting a dog. She discussed coping with these dynamics as well as challenges with having a new puppy in the house.     She processed the school year ending and managing differences in opinions at work. We reviewed coping for difficult interactions that increase anxiety. Overall, she reported that her mood has improved since she had to return her foster dog and that her anxiety is much more manageable.     She denies SI/HI/AVH. We will follow-up in one month.   Identified goals/objectives: continue to engage in activities that bring ravi and relief from stress; Identify expectations for family planning and parenthood; utilize anxiety management strategies.   Response to therapy: Engaged  Treatment plan and process: Continue with above stated tx goals; Process family and relationship dynamics; [place] setting; continued review of anxiety management

## 2024-06-05 ENCOUNTER — TELEMEDICINE (OUTPATIENT)
Dept: BEHAVIORAL HEALTH | Facility: CLINIC | Age: 29
End: 2024-06-05
Payer: COMMERCIAL

## 2024-06-05 DIAGNOSIS — F41.1 GAD (GENERALIZED ANXIETY DISORDER): ICD-10-CM

## 2024-06-05 PROCEDURE — 90834 PSYTX W PT 45 MINUTES: CPT | Performed by: PSYCHOLOGIST

## 2024-06-05 NOTE — PROGRESS NOTES
Start time: 3:33pm  End time: 4:18pm      The patient was informed of the current need to conduct treatment via telephone or telehealth due to Covid-19 pandemic. Patient consented to the use of the platform that may not be HIPAA compliant. I have confirmed the patient's identity via the following (minimum of three) acceptable identifiers as per  Policy PH-9:   1. Last 4 of social  2.   3. Address    Telephone/Televideo Informed Consent for Psychotherapy was reviewed with the patient as follows:  There are potential benefits and risks of the use of telephone or video-conferencing that differ from in-person sessions. Specifically, the telephone or televideo system we are using may not be HIPAA compliant and may present limits to patient confidentiality. Confidentiality still applies for telepsychology services, and nobody will record the session without your permission. You agree to use the telephone or video-conferencing platform selected for our virtual sessions, and I will explain how to use it.  1)             You need to use a webcam or smartphone during the session.  2)             It is important that you be in a quiet, private space that is free of distractions (including cell phone or other devices) during the session.  3)             It is important to use a secure internet connection rather than public/free Wi-Fi.  4)             It is important to be on time. If you need to cancel or change your tele-appointment, you must notify the psychologist in advance by phone or email.  5)             We need a back-up plan (e.g., phone number where you can be reached) to restart the session or to reschedule it, in the event of technical problems.  6)             We need a safety plan that includes at least one emergency contact and the closest emergency room to your location, in the event of a crisis situation.  7)             If you are not an adult, we need the permission of your parent or legal guardian (and  their contact information) for you to participate in telepsychology sessions.  Understanding and verbal agreement was attested to by the patient.    Jing has requested that her notes be blocked at this time.     Reason for care: Anxiety  Method of therapy: Supportive  Therapy summary: Jing processed school ending for the year and saying goodbye to the kids that will be graduating. 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    She continued to process having to return her foster dog and adopting a new puppy. Overall, things are going well with her new puppy.     She discussed recent challenges in a relationship dynamic with a friend related to planning a trip. She noted that there have been some increase in stressors but that overall she is managing well. She reported better ability to cope with stress and maintain low anxiety.     Action plan: Continue creating goals for the summer; maintain schedule and routine during increasing free time     She denies SI/HI/AVH. We will follow-up in one month.   Identified goals/objectives: continue to engage in activities that bring ravi and relief from stress; Identify expectations for family planning and parenthood; utilize anxiety management strategies.   Response to therapy: Engaged  Treatment plan and process: Continue with above stated tx goals; Process family and relationship dynamics; [place] setting; continued review of anxiety management

## 2024-06-17 DIAGNOSIS — F41.1 GENERALIZED ANXIETY DISORDER: Primary | ICD-10-CM

## 2024-06-17 RX ORDER — ALPRAZOLAM 0.25 MG/1
0.25 TABLET ORAL
Qty: 15 TABLET | Refills: 0 | Status: SHIPPED | OUTPATIENT
Start: 2024-06-17

## 2024-06-27 ENCOUNTER — APPOINTMENT (OUTPATIENT)
Dept: BEHAVIORAL HEALTH | Facility: CLINIC | Age: 29
End: 2024-06-27
Payer: COMMERCIAL

## 2024-06-27 DIAGNOSIS — F41.1 GAD (GENERALIZED ANXIETY DISORDER): ICD-10-CM

## 2024-06-27 PROCEDURE — 90837 PSYTX W PT 60 MINUTES: CPT | Performed by: PSYCHOLOGIST

## 2024-06-27 NOTE — PROGRESS NOTES
Start time: 1:00pm  End time: 1:57pm      The patient was informed of the current need to conduct treatment via telephone or telehealth due to Covid-19 pandemic. Patient consented to the use of the platform that may not be HIPAA compliant. I have confirmed the patient's identity via the following (minimum of three) acceptable identifiers as per  Policy PH-9:   1. Last 4 of social  2.   3. Address    Telephone/Televideo Informed Consent for Psychotherapy was reviewed with the patient as follows:  There are potential benefits and risks of the use of telephone or video-conferencing that differ from in-person sessions. Specifically, the telephone or televideo system we are using may not be HIPAA compliant and may present limits to patient confidentiality. Confidentiality still applies for telepsychology services, and nobody will record the session without your permission. You agree to use the telephone or video-conferencing platform selected for our virtual sessions, and I will explain how to use it.  1)             You need to use a webcam or smartphone during the session.  2)             It is important that you be in a quiet, private space that is free of distractions (including cell phone or other devices) during the session.  3)             It is important to use a secure internet connection rather than public/free Wi-Fi.  4)             It is important to be on time. If you need to cancel or change your tele-appointment, you must notify the psychologist in advance by phone or email.  5)             We need a back-up plan (e.g., phone number where you can be reached) to restart the session or to reschedule it, in the event of technical problems.  6)             We need a safety plan that includes at least one emergency contact and the closest emergency room to your location, in the event of a crisis situation.  7)             If you are not an adult, we need the permission of your parent or legal guardian (and  their contact information) for you to participate in telepsychology sessions.  Understanding and verbal agreement was attested to by the patient.    Jing has requested that her notes be blocked at this time.     Reason for care: Anxiety  Method of therapy: Supportive  Therapy summary: Jing reported that she is currently training her puppy and that it is going well so far. She discussed managing other's opinions regarding her dog training.     She processed recent appointment at the dentist's office and feeling as though the dental hygienist was asking inappropriate questions related to having children. She explored challenges with setting boundaries with this individual and became tearful when discussing this interaction.     She reported that she has been spending time with friends and getting out of the house. She has also been engaging in art more.     She discussed being invited on a trip with her partner and processed lack of desire to travel and attend a concert.     She continued to process relationship dynamics with friends and family.    Overall, she reported mostly stable mood and low anxiety.     Action plan: Continue creating goals for the summer; maintain schedule and routine during increasing free time     She denies SI/HI/AVH. We will follow-up in one month.   Identified goals/objectives: continue to engage in activities that bring ravi and relief from stress; Identify expectations for family planning and parenthood; utilize anxiety management strategies.   Response to therapy: Engaged  Treatment plan and process: Continue with above stated tx goals; Process family and relationship dynamics; [place] setting; continued review of anxiety management

## 2024-07-12 DIAGNOSIS — F41.1 GENERALIZED ANXIETY DISORDER: ICD-10-CM

## 2024-07-12 RX ORDER — SERTRALINE HYDROCHLORIDE 50 MG/1
50 TABLET, FILM COATED ORAL DAILY
Qty: 90 TABLET | Refills: 0 | Status: SHIPPED | OUTPATIENT
Start: 2024-07-12 | End: 2025-07-12

## 2024-08-05 ENCOUNTER — APPOINTMENT (OUTPATIENT)
Dept: BEHAVIORAL HEALTH | Facility: CLINIC | Age: 29
End: 2024-08-05
Payer: COMMERCIAL

## 2024-08-05 DIAGNOSIS — F41.1 GAD (GENERALIZED ANXIETY DISORDER): ICD-10-CM

## 2024-08-05 PROCEDURE — 90837 PSYTX W PT 60 MINUTES: CPT | Performed by: PSYCHOLOGIST

## 2024-08-05 NOTE — PROGRESS NOTES
Start time: 3:02pm  End time: 3:56pm      The patient was informed of the current need to conduct treatment via telephone or telehealth due to Covid-19 pandemic. Patient consented to the use of the platform that may not be HIPAA compliant. I have confirmed the patient's identity via the following (minimum of three) acceptable identifiers as per  Policy PH-9:   1. Last 4 of social  2.   3. Address    Telephone/Televideo Informed Consent for Psychotherapy was reviewed with the patient as follows:  There are potential benefits and risks of the use of telephone or video-conferencing that differ from in-person sessions. Specifically, the telephone or televideo system we are using may not be HIPAA compliant and may present limits to patient confidentiality. Confidentiality still applies for telepsychology services, and nobody will record the session without your permission. You agree to use the telephone or video-conferencing platform selected for our virtual sessions, and I will explain how to use it.  1)             You need to use a webcam or smartphone during the session.  2)             It is important that you be in a quiet, private space that is free of distractions (including cell phone or other devices) during the session.  3)             It is important to use a secure internet connection rather than public/free Wi-Fi.  4)             It is important to be on time. If you need to cancel or change your tele-appointment, you must notify the psychologist in advance by phone or email.  5)             We need a back-up plan (e.g., phone number where you can be reached) to restart the session or to reschedule it, in the event of technical problems.  6)             We need a safety plan that includes at least one emergency contact and the closest emergency room to your location, in the event of a crisis situation.  7)             If you are not an adult, we need the permission of your parent or legal guardian (and  "their contact information) for you to participate in telepsychology sessions.  Understanding and verbal agreement was attested to by the patient.    Jing has requested that her notes be blocked at this time.     Reason for care: Anxiety  Method of therapy: Supportive  Therapy summary: Jing reported that she has been having a \"relaxing day.\" She stated that she spent time with her family over this past weekend. She processed recent trip to [place] and challenging dynamics with the other friends on the trip. She discussed attempting to support her  through the difficulties he is having with his friends. She explored ways to prioritize relationships that are important and continue to practice acceptance about changing relationships.    She continued to process training her puppy and managing the opinions of others. She discussed recent challenging incident with someone watching the dog while she was out of town.     Action plan: maintain schedule and routine for the remainder of summer     She denies SI/HI/AVH. We will follow-up in one month.   Identified goals/objectives: continue to engage in activities that bring ravi and relief from stress; Identify expectations for family planning and parenthood; utilize anxiety management strategies.   Response to therapy: Engaged  Treatment plan and process: Continue with above stated tx goals; Process family and relationship dynamics; [place] setting; continued review of anxiety management   "

## 2024-08-22 ENCOUNTER — APPOINTMENT (OUTPATIENT)
Dept: BEHAVIORAL HEALTH | Facility: CLINIC | Age: 29
End: 2024-08-22
Payer: COMMERCIAL

## 2024-08-22 DIAGNOSIS — F41.1 GAD (GENERALIZED ANXIETY DISORDER): ICD-10-CM

## 2024-08-22 PROCEDURE — 90837 PSYTX W PT 60 MINUTES: CPT | Performed by: PSYCHOLOGIST

## 2024-08-22 NOTE — PROGRESS NOTES
Start time: 4:01pm  End time: 4:57pm      The patient was informed of the current need to conduct treatment via telephone or telehealth due to Covid-19 pandemic. Patient consented to the use of the platform that may not be HIPAA compliant. I have confirmed the patient's identity via the following (minimum of three) acceptable identifiers as per  Policy PH-9:   1. Last 4 of social  2.   3. Address    Telephone/Televideo Informed Consent for Psychotherapy was reviewed with the patient as follows:  There are potential benefits and risks of the use of telephone or video-conferencing that differ from in-person sessions. Specifically, the telephone or televideo system we are using may not be HIPAA compliant and may present limits to patient confidentiality. Confidentiality still applies for telepsychology services, and nobody will record the session without your permission. You agree to use the telephone or video-conferencing platform selected for our virtual sessions, and I will explain how to use it.  1)             You need to use a webcam or smartphone during the session.  2)             It is important that you be in a quiet, private space that is free of distractions (including cell phone or other devices) during the session.  3)             It is important to use a secure internet connection rather than public/free Wi-Fi.  4)             It is important to be on time. If you need to cancel or change your tele-appointment, you must notify the psychologist in advance by phone or email.  5)             We need a back-up plan (e.g., phone number where you can be reached) to restart the session or to reschedule it, in the event of technical problems.  6)             We need a safety plan that includes at least one emergency contact and the closest emergency room to your location, in the event of a crisis situation.  7)             If you are not an adult, we need the permission of your parent or legal guardian (and  "their contact information) for you to participate in telepsychology sessions.  Understanding and verbal agreement was attested to by the patient.    Jing has requested that her notes be blocked at this time.     Reason for care: Anxiety  Method of therapy: Supportive  Therapy summary: Jing stated that \"it has been quite a week.\" She discussed recent stressors at school and coping with the changes.     She described being late for her menstrual cycle. She discussed feelings about a positive vs negative pregnancy test. She plans to take a test this evening and processed uncertainty and fear of change. She described feeling \"too young\" for kids and explored what is leading to this feeling.     She stated that she took half of a pill of Xanax for her anxiety at the beginning of her week. She noted that her anxiety was related to starting school.    She identified questions she would like to prepare:  - Who do we want in the know?  - question for provider: do I need a medication change upon becoming pregnant?  - What foods can I not eat?  - Goal to research Dos and Don'ts for pregnancy     Action plan: cope with emotions related to start of school; follow goals related to possible pregnancy    She denies SI/HI/AVH. We will follow-up in one month.   Identified goals/objectives: continue to engage in activities that bring ravi and relief from stress; Identify expectations for family planning and parenthood; utilize anxiety management strategies.   Response to therapy: Engaged  Treatment plan and process: Continue with above stated tx goals; Process family and relationship dynamics; [place] setting; continued review of anxiety management   "

## 2024-09-16 ENCOUNTER — APPOINTMENT (OUTPATIENT)
Dept: RADIOLOGY | Facility: HOSPITAL | Age: 29
End: 2024-09-16
Payer: COMMERCIAL

## 2024-09-16 ENCOUNTER — HOSPITAL ENCOUNTER (OUTPATIENT)
Dept: RADIOLOGY | Facility: HOSPITAL | Age: 29
Discharge: HOME | End: 2024-09-16
Payer: COMMERCIAL

## 2024-09-16 ENCOUNTER — LAB (OUTPATIENT)
Dept: LAB | Facility: LAB | Age: 29
End: 2024-09-16
Payer: COMMERCIAL

## 2024-09-16 ENCOUNTER — INITIAL PRENATAL (OUTPATIENT)
Dept: OBSTETRICS AND GYNECOLOGY | Facility: CLINIC | Age: 29
End: 2024-09-16
Payer: COMMERCIAL

## 2024-09-16 ENCOUNTER — APPOINTMENT (OUTPATIENT)
Dept: PRIMARY CARE | Facility: CLINIC | Age: 29
End: 2024-09-16
Payer: COMMERCIAL

## 2024-09-16 VITALS — WEIGHT: 213.4 LBS | BODY MASS INDEX: 39.67 KG/M2 | DIASTOLIC BLOOD PRESSURE: 87 MMHG | SYSTOLIC BLOOD PRESSURE: 132 MMHG

## 2024-09-16 DIAGNOSIS — O36.80X0 PREGNANCY WITH INCONCLUSIVE FETAL VIABILITY, SINGLE OR UNSPECIFIED FETUS: ICD-10-CM

## 2024-09-16 DIAGNOSIS — N91.4 SECONDARY OLIGOMENORRHEA: Primary | ICD-10-CM

## 2024-09-16 DIAGNOSIS — Z12.4 PAP SMEAR FOR CERVICAL CANCER SCREENING: ICD-10-CM

## 2024-09-16 DIAGNOSIS — O20.0 THREATENED MISCARRIAGE (HHS-HCC): Primary | ICD-10-CM

## 2024-09-16 DIAGNOSIS — O20.0 THREATENED MISCARRIAGE (HHS-HCC): ICD-10-CM

## 2024-09-16 DIAGNOSIS — Z32.01 ENCOUNTER FOR PREGNANCY TEST, RESULT POSITIVE (HHS-HCC): ICD-10-CM

## 2024-09-16 LAB
ABO GROUP (TYPE) IN BLOOD: NORMAL
ERYTHROCYTE [DISTWIDTH] IN BLOOD BY AUTOMATED COUNT: 13.8 % (ref 11.5–14.5)
HCG SERPL-ACNC: 367 MIU/ML
HCT VFR BLD AUTO: 40.6 % (ref 36–46)
HGB BLD-MCNC: 12.8 G/DL (ref 12–16)
MCH RBC QN AUTO: 26.2 PG (ref 26–34)
MCHC RBC AUTO-ENTMCNC: 31.5 G/DL (ref 32–36)
MCV RBC AUTO: 83 FL (ref 80–100)
NRBC BLD-RTO: 0 /100 WBCS (ref 0–0)
PLATELET # BLD AUTO: 357 X10*3/UL (ref 150–450)
POC APPEARANCE, URINE: CLEAR
POC BILIRUBIN, URINE: NEGATIVE
POC BLOOD, URINE: ABNORMAL
POC COLOR, URINE: YELLOW
POC GLUCOSE, URINE: NEGATIVE MG/DL
POC KETONES, URINE: NEGATIVE MG/DL
POC LEUKOCYTES, URINE: NEGATIVE
POC NITRITE,URINE: NEGATIVE
POC PH, URINE: 6 PH
POC PROTEIN, URINE: NEGATIVE MG/DL
POC SPECIFIC GRAVITY, URINE: >=1.03
POC UROBILINOGEN, URINE: 0.2 EU/DL
PREGNANCY TEST URINE, POC: NEGATIVE
RBC # BLD AUTO: 4.89 X10*6/UL (ref 4–5.2)
REFLEX ADDED, ANEMIA PANEL: NORMAL
RH FACTOR (ANTIGEN D): NORMAL
WBC # BLD AUTO: 9.4 X10*3/UL (ref 4.4–11.3)

## 2024-09-16 PROCEDURE — 0500F INITIAL PRENATAL CARE VISIT: CPT | Performed by: OBSTETRICS & GYNECOLOGY

## 2024-09-16 PROCEDURE — 86900 BLOOD TYPING SEROLOGIC ABO: CPT

## 2024-09-16 PROCEDURE — 81003 URINALYSIS AUTO W/O SCOPE: CPT | Performed by: OBSTETRICS & GYNECOLOGY

## 2024-09-16 PROCEDURE — 84702 CHORIONIC GONADOTROPIN TEST: CPT

## 2024-09-16 PROCEDURE — 36415 COLL VENOUS BLD VENIPUNCTURE: CPT

## 2024-09-16 PROCEDURE — 87086 URINE CULTURE/COLONY COUNT: CPT | Mod: WESLAB | Performed by: OBSTETRICS & GYNECOLOGY

## 2024-09-16 PROCEDURE — 85027 COMPLETE CBC AUTOMATED: CPT

## 2024-09-16 PROCEDURE — 86901 BLOOD TYPING SEROLOGIC RH(D): CPT

## 2024-09-16 PROCEDURE — 87491 CHLMYD TRACH DNA AMP PROBE: CPT | Mod: WESLAB | Performed by: OBSTETRICS & GYNECOLOGY

## 2024-09-16 PROCEDURE — 76817 TRANSVAGINAL US OBSTETRIC: CPT | Performed by: RADIOLOGY

## 2024-09-16 PROCEDURE — 81025 URINE PREGNANCY TEST: CPT | Performed by: OBSTETRICS & GYNECOLOGY

## 2024-09-16 PROCEDURE — 76801 OB US < 14 WKS SINGLE FETUS: CPT

## 2024-09-16 PROCEDURE — 76801 OB US < 14 WKS SINGLE FETUS: CPT | Performed by: RADIOLOGY

## 2024-09-16 ASSESSMENT — PATIENT HEALTH QUESTIONNAIRE - PHQ9
1. LITTLE INTEREST OR PLEASURE IN DOING THINGS: SEVERAL DAYS
2. FEELING DOWN, DEPRESSED OR HOPELESS: SEVERAL DAYS
SUM OF ALL RESPONSES TO PHQ9 QUESTIONS 1 & 2: 2
10. IF YOU CHECKED OFF ANY PROBLEMS, HOW DIFFICULT HAVE THESE PROBLEMS MADE IT FOR YOU TO DO YOUR WORK, TAKE CARE OF THINGS AT HOME, OR GET ALONG WITH OTHER PEOPLE: NOT DIFFICULT AT ALL

## 2024-09-16 ASSESSMENT — ENCOUNTER SYMPTOMS
OCCASIONAL FEELINGS OF UNSTEADINESS: 0
LOSS OF SENSATION IN FEET: 0
DEPRESSION: 0

## 2024-09-16 ASSESSMENT — PAIN SCALES - GENERAL: PAINLEVEL_OUTOF10: 0 - NO PAIN

## 2024-09-16 ASSESSMENT — PAIN - FUNCTIONAL ASSESSMENT: PAIN_FUNCTIONAL_ASSESSMENT: 0-10

## 2024-09-17 ENCOUNTER — CLINICAL SUPPORT (OUTPATIENT)
Dept: OBSTETRICS AND GYNECOLOGY | Facility: CLINIC | Age: 29
End: 2024-09-17
Payer: COMMERCIAL

## 2024-09-17 DIAGNOSIS — O26.899 RH NEGATIVE STATE IN ANTEPARTUM PERIOD (HHS-HCC): Primary | ICD-10-CM

## 2024-09-17 DIAGNOSIS — Z67.91 RH NEGATIVE STATE IN ANTEPARTUM PERIOD (HHS-HCC): Primary | ICD-10-CM

## 2024-09-17 DIAGNOSIS — O20.0 THREATENED MISCARRIAGE (HHS-HCC): ICD-10-CM

## 2024-09-17 LAB
C TRACH RRNA SPEC QL NAA+PROBE: NEGATIVE
N GONORRHOEA DNA SPEC QL PROBE+SIG AMP: NEGATIVE

## 2024-09-17 PROCEDURE — 96372 THER/PROPH/DIAG INJ SC/IM: CPT | Performed by: OBSTETRICS & GYNECOLOGY

## 2024-09-17 PROCEDURE — 2500000004 HC RX 250 GENERAL PHARMACY W/ HCPCS (ALT 636 FOR OP/ED): Performed by: OBSTETRICS & GYNECOLOGY

## 2024-09-17 NOTE — PROGRESS NOTES
Subjective   Patient ID [id number]   Jing Morrison is a 28 y.o.  at 8w0d with a working estimated date of delivery of 2025, by Last Menstrual Period who presents for an initial prenatal visit.     Her pregnancy is complicated by:  Started bleeding, cramping, passing clots/tissue a couple of days prior, has been taking preg test and still positive    OB History    Para Term  AB Living   1             SAB IAB Ectopic Multiple Live Births                  # Outcome Date GA Lbr Antoni/2nd Weight Sex Type Anes PTL Lv   1 Current                   Objective   Physical Exam  Weight: 96.8 kg (213 lb 6.4 oz)  Pregravid BMI: Could not be calculated  BP: 132/87          PROCEDURE:  OB/GYN Transvaginal U/S    Intrauterine - no  Yolk Sac - no  Fetal Pole- no  FHT  no  EDC  n/a  CRL  n/a  Impression: Clots/debris in lower uterine segment/cervix, suspect c/w sab in progress     OBGyn Exam    General Physical Exam    HEENT: normal Heart: normal Skin: normal   Thyroid: normal Lungs: normal Extremities: normal   Lymph Nodes: normal Breasts: normal Neurological: normal   Abdomen: normal        Pelvic Exam    Vulva: normal Vagina: normal   Cervix: os dilated to apprx 2cm, clot noted, scant bleeding noted Adnexa: normal   Rectum: normal Spines: average   Subpubic Arch: normal       Prenatal Labs  Results for orders placed or performed in visit on 24   C. Trachomatis / N. Gonorrhoeae, Amplified Detection   Result Value Ref Range    Neisseria gonorrhea,Amplified Negative Negative    Chlamydia trachomatis, Amplified Negative Negative   POCT UA Automated manually resulted   Result Value Ref Range    POC Color, Urine Yellow Straw, Yellow, Light-Yellow    POC Appearance, Urine Clear Clear    POC Glucose, Urine NEGATIVE NEGATIVE mg/dl    POC Bilirubin, Urine NEGATIVE NEGATIVE    POC Ketones, Urine NEGATIVE NEGATIVE mg/dl    POC Specific Gravity, Urine >=1.030 1.005 - 1.035    POC Blood, Urine MODERATE (2+) (A) NEGATIVE     POC PH, Urine 6.0 No Reference Range Established PH    POC Protein, Urine NEGATIVE NEGATIVE, 30 (1+) mg/dl    POC Urobilinogen, Urine 0.2 0.2, 1.0 EU/DL    Poc Nitrite, Urine NEGATIVE NEGATIVE    POC Leukocytes, Urine NEGATIVE NEGATIVE   POCT pregnancy, urine manually resulted   Result Value Ref Range    Preg Test, Ur Negative Negative         Assessment/Plan   Problem List Items Addressed This Visit    None  Visit Diagnoses         Codes    Secondary oligomenorrhea    -  Primary N91.4    Relevant Orders    POCT pregnancy, urine manually resulted (Completed)    Encounter for pregnancy test, result positive ([facility])     Z32.01    Relevant Orders    POCT UA Automated manually resulted (Completed)    POCT pregnancy, urine manually resulted (Completed)    Urine culture    C. Trachomatis / N. Gonorrhoeae, Amplified Detection (Completed)    Pregnancy with inconclusive fetal viability, single or unspecified fetus ([facility])     O36.80X0    Relevant Orders    Point of Care Ultrasound    Threatened miscarriage ([facility])     O20.0    Relevant Orders    CBC Anemia Panel With Reflex, Pregnancy (Completed)    Abo/Rh (Completed)    Human Chorionic Gonadotropin, Serum Quantitative (Completed)    US PELVIS OB TRANSABDOMINAL W TRANSVAGINAL UP TO 1ST TRIMESTER (Completed)    Pap smear for cervical cancer screening     Z12.4    Relevant Orders    THINPREP PAP          Suspect sab, os opened and bleeding noted, for formal us to r/o adenexal masses and confirm  Recheck in 1wk, sab precautions given

## 2024-09-18 LAB — BACTERIA UR CULT: NORMAL

## 2024-09-23 ENCOUNTER — LAB (OUTPATIENT)
Dept: LAB | Facility: LAB | Age: 29
End: 2024-09-23
Payer: COMMERCIAL

## 2024-09-23 ENCOUNTER — TELEMEDICINE (OUTPATIENT)
Dept: OBSTETRICS AND GYNECOLOGY | Facility: CLINIC | Age: 29
End: 2024-09-23
Payer: COMMERCIAL

## 2024-09-23 DIAGNOSIS — O20.0 THREATENED MISCARRIAGE (HHS-HCC): ICD-10-CM

## 2024-09-23 DIAGNOSIS — O03.9 COMPLETE MISCARRIAGE (HHS-HCC): Primary | ICD-10-CM

## 2024-09-23 LAB — B-HCG SERPL-ACNC: 18 MIU/ML

## 2024-09-23 PROCEDURE — 36415 COLL VENOUS BLD VENIPUNCTURE: CPT

## 2024-09-23 PROCEDURE — 84702 CHORIONIC GONADOTROPIN TEST: CPT

## 2024-09-23 PROCEDURE — 99213 OFFICE O/P EST LOW 20 MIN: CPT | Mod: 95 | Performed by: OBSTETRICS & GYNECOLOGY

## 2024-09-23 PROCEDURE — 99213 OFFICE O/P EST LOW 20 MIN: CPT | Performed by: OBSTETRICS & GYNECOLOGY

## 2024-09-23 NOTE — PROGRESS NOTES
Consent:  Verbal consent was requested and obtained from patient on this date for a telehealth visit to determine if a office visit would be necessary for the patient's complaint(s).  Audio was performed due to the unavailability of video technology for the patient to participate face-to-face.    Referring Physician: No referring provider defined for this encounter.  Primary Care Provider: ALBERT Avelar-CNP     Subjective    HPI: pt here for follow-up from [facility]. Bleeding and cramping have improved, doing well today. Coping well after loss.     Past Medical History:   Diagnosis Date    Allergic     Anxiety     Depression     Headache     Heart murmur         Past Surgical History:   Procedure Laterality Date    OTHER SURGICAL HISTORY  05/28/2020    [place] tooth extraction    WISDOM TOOTH EXTRACTION          Social History     Tobacco Use    Smoking status: Never    Smokeless tobacco: Never   Vaping Use    Vaping status: Never Used   Substance Use Topics    Alcohol use: Yes     Comment: I drink less than once a week. Maybe 1-2 times a month    Drug use: Yes     Types: Benzodiazepines     Comment: prescription        Current Outpatient Medications   Medication Instructions    ALPRAZolam (XANAX) 0.25 mg, oral, Daily RT    norgestimate-ethinyl estradioL (Tri Femynor) 0.18/0.215/0.25 mg-35 mcg (28) tablet 1 tablet, oral, Daily    prenatal no122/iron/folic acid (PRENATAL MULTI ORAL) oral    sertraline (ZOLOFT) 50 mg, oral, Daily, PT NEEDS AN APPT, NO FURTHER REFILLS      Objective    BSA: There is no height or weight on file to calculate BSA.  There were no vitals taken for this visit.     Physical Exam  General: AAOx3 in NAD   Psych: mood and affect are appropiate. Able to consent.     Assessment/Plan         Problem List Items Addressed This Visit    None  Visit Diagnoses         Codes    Complete miscarriage ([facility])    -  Primary O03.9        Cont following hcg quants until negative.      Treatment Plans          All new and/or current medications discussed and reviewed including side effects with patient/caregiver, Understanding:  Caregiver/Patient expressed understanding., Adherence:  Barriers to adherence identified and discussed if present.

## 2024-09-30 ENCOUNTER — LAB (OUTPATIENT)
Dept: LAB | Facility: LAB | Age: 29
End: 2024-09-30
Payer: COMMERCIAL

## 2024-09-30 DIAGNOSIS — O20.0 THREATENED MISCARRIAGE (HHS-HCC): ICD-10-CM

## 2024-10-01 LAB
CYTOLOGY CMNT CVX/VAG CYTO-IMP: NORMAL
LAB AP HPV GENOTYPE QUESTION: YES
LAB AP HPV HR: NORMAL
LABORATORY COMMENT REPORT: NORMAL
LMP START DATE: NORMAL
PATH REPORT.TOTAL CANCER: NORMAL

## 2024-10-02 ENCOUNTER — LAB (OUTPATIENT)
Dept: LAB | Facility: LAB | Age: 29
End: 2024-10-02
Payer: COMMERCIAL

## 2024-10-02 DIAGNOSIS — O20.0 THREATENED MISCARRIAGE (HHS-HCC): ICD-10-CM

## 2024-10-02 LAB — B-HCG SERPL-ACNC: 2 MIU/ML

## 2024-10-02 PROCEDURE — 36415 COLL VENOUS BLD VENIPUNCTURE: CPT

## 2024-10-02 PROCEDURE — 84702 CHORIONIC GONADOTROPIN TEST: CPT

## 2024-10-07 ENCOUNTER — APPOINTMENT (OUTPATIENT)
Dept: BEHAVIORAL HEALTH | Facility: CLINIC | Age: 29
End: 2024-10-07
Payer: COMMERCIAL

## 2024-10-07 DIAGNOSIS — F41.1 GAD (GENERALIZED ANXIETY DISORDER): ICD-10-CM

## 2024-10-07 PROCEDURE — 90837 PSYTX W PT 60 MINUTES: CPT | Performed by: PSYCHOLOGIST

## 2024-10-07 NOTE — PROGRESS NOTES
Start time: 4:01pm  End time: 4:55pm      The patient was informed of the current need to conduct treatment via telephone or telehealth due to Covid-19 pandemic. Patient consented to the use of the platform that may not be HIPAA compliant. I have confirmed the patient's identity via the following (minimum of three) acceptable identifiers as per  Policy PH-9:   1. Last 4 of social  2.   3. Address    Telephone/Televideo Informed Consent for Psychotherapy was reviewed with the patient as follows:  There are potential benefits and risks of the use of telephone or video-conferencing that differ from in-person sessions. Specifically, the telephone or televideo system we are using may not be HIPAA compliant and may present limits to patient confidentiality. Confidentiality still applies for telepsychology services, and nobody will record the session without your permission. You agree to use the telephone or video-conferencing platform selected for our virtual sessions, and I will explain how to use it.  1)             You need to use a webcam or smartphone during the session.  2)             It is important that you be in a quiet, private space that is free of distractions (including cell phone or other devices) during the session.  3)             It is important to use a secure internet connection rather than public/free Wi-Fi.  4)             It is important to be on time. If you need to cancel or change your tele-appointment, you must notify the psychologist in advance by phone or email.  5)             We need a back-up plan (e.g., phone number where you can be reached) to restart the session or to reschedule it, in the event of technical problems.  6)             We need a safety plan that includes at least one emergency contact and the closest emergency room to your location, in the event of a crisis situation.  7)             If you are not an adult, we need the permission of your parent or legal guardian (and  "their contact information) for you to participate in telepsychology sessions.  Understanding and verbal agreement was attested to by the patient.    Jing has requested that her notes be blocked at this time.     Reason for care: Anxiety  Method of therapy: Supportive  Therapy summary: Jing described this past month as the \"hardest month of her life.\" She discussed experiencing a miscarriage and having to talk with her family after sharing with them that she was pregnant. She processed her experience. We explored how facts are helpful in grieving this type of loss.    She discussed wanting to try to become pregnant again and waiting until she feels \"emotionally ready.\" She explored what it would mean to be ready.    She processed recent dynamics with her mother-in-law. She explored how their dynamics have been challenging recently and how their interactions have been impacting her mental health and worries about the future.     She discussed positive things that have happened since our last session and stated that she is trying to take a balanced perspective of her current life. She described feeling like she is \"coping well.\"     Action plan: cope with pregnancy loss; identify what it would mean to be \"emotionally ready\" for another pregnancy    She denies SI/HI/AVH. We will follow-up in one month.   Identified goals/objectives: coping with grief; continue to engage in activities that bring ravi and relief from stress; Identify expectations for family planning and parenthood; utilize anxiety management strategies.   Response to therapy: Engaged  Treatment plan and process: Continue with above stated tx goals; Process family and relationship dynamics; [place] setting; continued review of anxiety management   "

## 2024-10-10 DIAGNOSIS — F41.1 GENERALIZED ANXIETY DISORDER: ICD-10-CM

## 2024-10-10 RX ORDER — SERTRALINE HYDROCHLORIDE 50 MG/1
50 TABLET, FILM COATED ORAL DAILY
Qty: 90 TABLET | Refills: 1 | Status: SHIPPED | OUTPATIENT
Start: 2024-10-10 | End: 2025-10-10

## 2024-11-07 ENCOUNTER — APPOINTMENT (OUTPATIENT)
Dept: BEHAVIORAL HEALTH | Facility: CLINIC | Age: 29
End: 2024-11-07
Payer: COMMERCIAL

## 2024-11-07 DIAGNOSIS — F41.1 GAD (GENERALIZED ANXIETY DISORDER): ICD-10-CM

## 2024-11-07 PROCEDURE — 90834 PSYTX W PT 45 MINUTES: CPT | Performed by: PSYCHOLOGIST

## 2024-11-07 NOTE — PROGRESS NOTES
Start time: 4:03pm  End time: 4:43pm      The patient was informed of the current need to conduct treatment via telephone or telehealth due to Covid-19 pandemic. Patient consented to the use of the platform that may not be HIPAA compliant. I have confirmed the patient's identity via the following (minimum of three) acceptable identifiers as per  Policy PH-9:   1. Last 4 of social  2.   3. Address    Telephone/Televideo Informed Consent for Psychotherapy was reviewed with the patient as follows:  There are potential benefits and risks of the use of telephone or video-conferencing that differ from in-person sessions. Specifically, the telephone or televideo system we are using may not be HIPAA compliant and may present limits to patient confidentiality. Confidentiality still applies for telepsychology services, and nobody will record the session without your permission. You agree to use the telephone or video-conferencing platform selected for our virtual sessions, and I will explain how to use it.  1)             You need to use a webcam or smartphone during the session.  2)             It is important that you be in a quiet, private space that is free of distractions (including cell phone or other devices) during the session.  3)             It is important to use a secure internet connection rather than public/free Wi-Fi.  4)             It is important to be on time. If you need to cancel or change your tele-appointment, you must notify the psychologist in advance by phone or email.  5)             We need a back-up plan (e.g., phone number where you can be reached) to restart the session or to reschedule it, in the event of technical problems.  6)             We need a safety plan that includes at least one emergency contact and the closest emergency room to your location, in the event of a crisis situation.  7)             If you are not an adult, we need the permission of your parent or legal guardian (and  "their contact information) for you to participate in telepsychology sessions.  Understanding and verbal agreement was attested to by the patient.    Jing has requested that her notes be blocked at this time.     Reason for care: Anxiety  Method of therapy: Supportive  Therapy summary: Jing processed the election results and feeling \"disappointed.\" She discussed how her own experiences have influenced her political beliefs. She shared about supporting a friend through the results.     She stated that she and her  are going to try for another baby. She continued to process her emotions around this and noted that she is feeling ready for this. She stated that a friend at work is having a baby shower and she discussed feeling uncertain if she is ready to go to this. She reported that she has had some significant reactions to reminders of her pregnancy. She stated that \"for the most part Im doing ok.\" She stated that she is still engaging in self-care.      Overall, she reported that she is doing well and coping by using her support.     Action plan: cope with pregnancy loss; identify what it would mean to be \"emotionally ready\" for another pregnancy; cope with recent stressors.    She denies SI/HI/AVH. We will follow-up in one month.   Identified goals/objectives: coping with grief; continue to engage in activities that bring ravi and relief from stress; Identify expectations for family planning and parenthood; utilize anxiety management strategies.   Response to therapy: Engaged  Treatment plan and process: Continue with above stated tx goals; Process family and relationship dynamics; [place] setting; continued review of anxiety management   "

## 2024-11-13 ENCOUNTER — ANCILLARY PROCEDURE (OUTPATIENT)
Dept: URGENT CARE | Age: 29
End: 2024-11-13
Payer: COMMERCIAL

## 2024-11-13 ENCOUNTER — OFFICE VISIT (OUTPATIENT)
Dept: URGENT CARE | Age: 29
End: 2024-11-13
Payer: COMMERCIAL

## 2024-11-13 VITALS
HEIGHT: 61 IN | WEIGHT: 210 LBS | HEART RATE: 123 BPM | SYSTOLIC BLOOD PRESSURE: 138 MMHG | DIASTOLIC BLOOD PRESSURE: 78 MMHG | BODY MASS INDEX: 39.65 KG/M2 | TEMPERATURE: 99.3 F | OXYGEN SATURATION: 96 %

## 2024-11-13 DIAGNOSIS — R05.9 COUGH, UNSPECIFIED TYPE: ICD-10-CM

## 2024-11-13 DIAGNOSIS — J18.9 PNEUMONIA OF RIGHT LUNG DUE TO INFECTIOUS ORGANISM, UNSPECIFIED PART OF LUNG: ICD-10-CM

## 2024-11-13 DIAGNOSIS — R05.9 COUGH, UNSPECIFIED TYPE: Primary | ICD-10-CM

## 2024-11-13 LAB
POC BINAX EXPIRATION: NORMAL
POC BINAX NOW COVID SERIAL NUMBER: NORMAL
POC RAPID INFLUENZA A: NEGATIVE
POC RAPID INFLUENZA B: NEGATIVE
POC RAPID STREP: NEGATIVE
POC SARS-COV-2 AG BINAX: NORMAL
PREGNANCY TEST URINE, POC: NEGATIVE

## 2024-11-13 PROCEDURE — 71046 X-RAY EXAM CHEST 2 VIEWS: CPT | Performed by: NURSE PRACTITIONER

## 2024-11-13 RX ORDER — AMOXICILLIN AND CLAVULANATE POTASSIUM 875; 125 MG/1; MG/1
1 TABLET, FILM COATED ORAL 2 TIMES DAILY
Qty: 10 TABLET | Refills: 0 | Status: SHIPPED | OUTPATIENT
Start: 2024-11-13 | End: 2024-11-18

## 2024-11-13 RX ORDER — AZITHROMYCIN 250 MG/1
TABLET, FILM COATED ORAL
Qty: 6 TABLET | Refills: 0 | Status: SHIPPED | OUTPATIENT
Start: 2024-11-13 | End: 2024-11-18

## 2024-11-13 RX ORDER — ALBUTEROL SULFATE 90 UG/1
2 INHALANT RESPIRATORY (INHALATION) EVERY 6 HOURS PRN
Qty: 18 G | Refills: 0 | Status: SHIPPED | OUTPATIENT
Start: 2024-11-13 | End: 2025-11-13

## 2024-11-13 ASSESSMENT — ENCOUNTER SYMPTOMS
CHEST TIGHTNESS: 1
WHEEZING: 1
SORE THROAT: 1
DIAPHORESIS: 1
RHINORRHEA: 1
CHILLS: 1
COUGH: 1
FEVER: 1
FATIGUE: 1

## 2024-11-13 ASSESSMENT — PAIN SCALES - GENERAL: PAINLEVEL_OUTOF10: 6

## 2024-11-13 NOTE — PATIENT INSTRUCTIONS
Augmentin two times a day for five days  Zpack as directed  Albuterol as needed for cough/SOB/wheezing  Follow up with PCP in 5-7 days  If worsening, please go to ER    Please follow up with your primary provider within one week if symptoms do not improve.  You may schedule an appointment online at [email or web address] or call [phone]. Go to the Emergency Department if symptoms significantly worsen or if you develop chest pain or shortness of breath.

## 2024-11-13 NOTE — PROGRESS NOTES
"Subjective   Patient ID: Jing Morrison is a 28 y.o. female. They present today with a chief complaint of Cough.    History of Present Illness  Jing Morrison is a 28 y.o. female who presents to the clinic for fever, chills, cough, HA for the past 4-5 days.   Pt denies any chest pain, sob, N/V at this time in clinic.             Past Medical History  Allergies as of 11/13/2024 - Reviewed 11/13/2024   Allergen Reaction Noted    Sulfa (sulfonamide antibiotics) Hives 03/08/2023       (Not in a hospital admission)       Past Medical History:   Diagnosis Date    Allergic     Anxiety     Depression     Headache     Heart murmur        Past Surgical History:   Procedure Laterality Date    OTHER SURGICAL HISTORY  05/28/2020    [place] tooth extraction    WISDOM TOOTH EXTRACTION          reports that she has never smoked. She has never used smokeless tobacco. She reports current alcohol use. She reports current drug use. Drug: Benzodiazepines.    Review of Systems  Review of Systems   Constitutional:  Positive for chills, diaphoresis, fatigue and fever.   HENT:  Positive for congestion, rhinorrhea and sore throat.    Respiratory:  Positive for cough, chest tightness and wheezing.    All other systems reviewed and are negative.                                 Objective    Vitals:    11/13/24 1625   BP: 138/78   BP Location: Left arm   Patient Position: Sitting   Pulse: (!) 123   Temp: 37.4 °C (99.3 °F)   SpO2: 96%   Weight: 95.3 kg (210 lb)   Height: 1.549 m (5' 1\")     Patient's last menstrual period was 07/23/2024.    Physical Exam  Constitutional:       Appearance: Normal appearance.   HENT:      Right Ear: Tympanic membrane normal.      Left Ear: Tympanic membrane normal.      Mouth/Throat:      Pharynx: Posterior oropharyngeal erythema and postnasal drip present.   Cardiovascular:      Rate and Rhythm: Normal rate and regular rhythm.   Pulmonary:      Breath sounds: Wheezing present.      Comments: Upper " lobes  Neurological:      General: No focal deficit present.      Mental Status: She is alert and oriented to person, place, and time. Mental status is at baseline.   Psychiatric:         Mood and Affect: Mood normal.         Behavior: Behavior normal.         Procedures    Point of Care Test & Imaging Results from this visit  Results for orders placed or performed in visit on 11/13/24   POCT Covid-19 Rapid Antigen   Result Value Ref Range    Binax NOW Covid Serial Number n     BINAX NOW Covid Expiration n     POC AWAIS-COV-2 AG  Presumptive negative test for SARS-CoV-2 (no antigen detected)     Presumptive negative test for SARS-CoV-2 (no antigen detected)   POCT Influenza A/B manually resulted   Result Value Ref Range    POC Rapid Influenza A Negative Negative    POC Rapid Influenza B Negative Negative   POCT rapid strep A manually resulted   Result Value Ref Range    POC Rapid Strep Negative Negative   POCT pregnancy, urine manually resulted   Result Value Ref Range    Preg Test, Ur Negative Negative      XR chest 2 views    Result Date: 11/13/2024  Interpreted By:  Nestor Allen, STUDY: XR CHEST 2 VIEWS   INDICATION: Signs/Symptoms:cough.   COMPARISON: None   ACCESSION NUMBER(S): [id number]   ORDERING CLINICIAN: ARIELLE BARRERA   FINDINGS: Right upper lobe consolidation. No effusion or pneumothorax. Heart size within normal limits.       Findings consistent with right upper lobe pneumonia.   Signed by: Nestor Allen 11/13/2024 5:28 PM Dictation workstation:   [id number]     Diagnostic study results (if any) were reviewed by ALEKSANDAR Aggarwal.    Assessment/Plan   Allergies, medications, history, and pertinent labs/EKGs/Imaging reviewed by ALEKSANDAR Aggarwal.     Medical Decision Making  Signs, symptoms, examination, and my interpretation of XRAY imaging consistent with community acquired pneumonia. No evidence of sepsis or acute respiratory distress at this time. Will treat with appropriate antibiotics  for age group/risk factors. Patient is advised if symptoms change or worsen go to ED for further evaluation and care. Otherwise follow-up with family doctor for recheck within 5-7 days     Orders and Diagnoses  Diagnoses and all orders for this visit:  Cough, unspecified type  -     POCT Covid-19 Rapid Antigen  -     POCT Influenza A/B manually resulted  -     POCT rapid strep A manually resulted  -     XR chest 2 views; Future  -     POCT pregnancy, urine manually resulted  Pneumonia of right lung due to infectious organism, unspecified part of lung  -     amoxicillin-pot clavulanate (Augmentin) 875-125 mg tablet; Take 1 tablet by mouth 2 times a day for 5 days.  -     azithromycin (Zithromax) 250 mg tablet; Take 2 tabs (500 mg) by mouth today, than 1 daily for 4 days.  -     albuterol 90 mcg/actuation inhaler; Inhale 2 puffs every 6 hours if needed for wheezing.      Medical Admin Record      Patient disposition: Home    Electronically signed by Guillaume Goel, APRN-CNP  9:24 PM

## 2024-11-25 ENCOUNTER — APPOINTMENT (OUTPATIENT)
Dept: PRIMARY CARE | Facility: CLINIC | Age: 29
End: 2024-11-25
Payer: COMMERCIAL

## 2024-11-25 VITALS
HEART RATE: 108 BPM | BODY MASS INDEX: 40.4 KG/M2 | TEMPERATURE: 98.2 F | SYSTOLIC BLOOD PRESSURE: 142 MMHG | OXYGEN SATURATION: 98 % | WEIGHT: 214 LBS | HEIGHT: 61 IN | DIASTOLIC BLOOD PRESSURE: 80 MMHG | RESPIRATION RATE: 20 BRPM

## 2024-11-25 DIAGNOSIS — R53.83 OTHER FATIGUE: ICD-10-CM

## 2024-11-25 DIAGNOSIS — E55.9 VITAMIN D DEFICIENCY: ICD-10-CM

## 2024-11-25 DIAGNOSIS — Z13.220 LIPID SCREENING: ICD-10-CM

## 2024-11-25 DIAGNOSIS — Z00.00 PHYSICAL EXAM, ANNUAL: Primary | ICD-10-CM

## 2024-11-25 DIAGNOSIS — F41.1 GENERALIZED ANXIETY DISORDER: ICD-10-CM

## 2024-11-25 DIAGNOSIS — R05.9 COUGH, UNSPECIFIED TYPE: ICD-10-CM

## 2024-11-25 PROCEDURE — 3008F BODY MASS INDEX DOCD: CPT | Performed by: NURSE PRACTITIONER

## 2024-11-25 PROCEDURE — 99395 PREV VISIT EST AGE 18-39: CPT | Performed by: NURSE PRACTITIONER

## 2024-11-25 RX ORDER — BENZONATATE 100 MG/1
100 CAPSULE ORAL 3 TIMES DAILY PRN
Qty: 30 CAPSULE | Refills: 0 | Status: SHIPPED | OUTPATIENT
Start: 2024-11-25 | End: 2024-12-05

## 2024-11-25 RX ORDER — SERTRALINE HYDROCHLORIDE 50 MG/1
50 TABLET, FILM COATED ORAL DAILY
Qty: 90 TABLET | Refills: 3 | Status: SHIPPED | OUTPATIENT
Start: 2024-11-25 | End: 2025-11-25

## 2024-11-25 ASSESSMENT — ENCOUNTER SYMPTOMS
COUGH: 1
SHORTNESS OF BREATH: 1

## 2024-11-25 NOTE — PROGRESS NOTES
Subjective   Jing Morrison is a 29 y.o. female who presents for Medication Check up.  Patient presents for a med check, stated had Pneumonia about 2 weeks ago and still has persistent cough.  Had miscarriage in September.  Covid Test Negative done 11.13.24    Had flu, covid shot, and pap this year.      Review of Systems   Constitutional:  Positive for fatigue. Negative for chills and fever.   HENT:  Negative for congestion, rhinorrhea and sore throat.    Respiratory:  Positive for cough and shortness of breath.    Cardiovascular:  Negative for chest pain.   Gastrointestinal:  Negative for constipation and diarrhea.   Genitourinary:  Negative for dysuria.   Neurological:  Negative for headaches.   Psychiatric/Behavioral:  Positive for sleep disturbance. The patient is nervous/anxious.    All other systems reviewed and are negative.      Objective   Physical Exam  Constitutional:       General: She is not in acute distress.     Appearance: She is not ill-appearing.   HENT:      Head: Normocephalic and atraumatic.      Right Ear: Tympanic membrane, ear canal and external ear normal.      Left Ear: Tympanic membrane, ear canal and external ear normal.      Nose: Congestion present.      Mouth/Throat:      Mouth: Mucous membranes are moist.      Pharynx: Oropharynx is clear.   Eyes:      Conjunctiva/sclera: Conjunctivae normal.      Pupils: Pupils are equal, round, and reactive to light.   Cardiovascular:      Rate and Rhythm: Normal rate and regular rhythm.      Pulses: Normal pulses.      Heart sounds: Normal heart sounds.   Pulmonary:      Effort: Pulmonary effort is normal. No respiratory distress.      Breath sounds: Normal breath sounds.   Abdominal:      General: Bowel sounds are normal.      Palpations: Abdomen is soft.      Tenderness: There is no abdominal tenderness.   Musculoskeletal:         General: Normal range of motion.   Skin:     General: Skin is warm and dry.   Neurological:      General: No focal  "deficit present.      Mental Status: She is alert and oriented to person, place, and time.   Psychiatric:         Mood and Affect: Mood normal.         Behavior: Behavior normal.         Thought Content: Thought content normal.         Judgment: Judgment normal.       /80 (BP Location: Left arm, Patient Position: Sitting, BP Cuff Size: Large adult)   Pulse 108   Temp 36.8 °C (98.2 °F) (Temporal)   Resp 20   Ht 1.549 m (5' 1\")   Wt 97.1 kg (214 lb)   LMP 07/23/2024 Comment: Patient had Miscarriaged in September.  SpO2 98%   Breastfeeding Unknown   BMI 40.43 kg/m²       Assessment/Plan   Problem List Items Addressed This Visit       Fatigue    Relevant Orders    TSH with reflex to Free T4 if abnormal    CBC and Auto Differential    Comprehensive Metabolic Panel    Vitamin B12    Generalized anxiety disorder    Relevant Medications    sertraline (Zoloft) 50 mg tablet    Vitamin D deficiency    Relevant Orders    Vitamin D 25-Hydroxy,Total (for eval of Vitamin D levels)     Other Visit Diagnoses       Physical exam, annual    -  Primary    Lipid screening        Relevant Orders    Lipid Panel    Cough, unspecified type              Patient Instructions   Patient to continue medications as ordered. Have fasting labs drawn, and we will call with results when available. Follow-up in 1 year, or sooner if needed. Call the office if any problems or concerns in the meantime.     "

## 2024-12-09 ASSESSMENT — ENCOUNTER SYMPTOMS
CONSTIPATION: 0
DIARRHEA: 0
FEVER: 0
DYSURIA: 0
CHILLS: 0
NERVOUS/ANXIOUS: 1
FATIGUE: 1
SLEEP DISTURBANCE: 1
SORE THROAT: 0
HEADACHES: 0
RHINORRHEA: 0

## 2024-12-19 ENCOUNTER — APPOINTMENT (OUTPATIENT)
Dept: BEHAVIORAL HEALTH | Facility: CLINIC | Age: 29
End: 2024-12-19
Payer: COMMERCIAL

## 2024-12-19 DIAGNOSIS — F41.1 GAD (GENERALIZED ANXIETY DISORDER): ICD-10-CM

## 2024-12-19 PROCEDURE — 90834 PSYTX W PT 45 MINUTES: CPT | Performed by: PSYCHOLOGIST

## 2024-12-19 NOTE — PROGRESS NOTES
Start time: 4:05pm  End time: 4:50pm      The patient was informed of the current need to conduct treatment via telephone or telehealth due to Covid-19 pandemic. Patient consented to the use of the platform that may not be HIPAA compliant. I have confirmed the patient's identity via the following (minimum of three) acceptable identifiers as per  Policy PH-9:   1. Last 4 of social  2.   3. Address    Telephone/Televideo Informed Consent for Psychotherapy was reviewed with the patient as follows:  There are potential benefits and risks of the use of telephone or video-conferencing that differ from in-person sessions. Specifically, the telephone or televideo system we are using may not be HIPAA compliant and may present limits to patient confidentiality. Confidentiality still applies for telepsychology services, and nobody will record the session without your permission. You agree to use the telephone or video-conferencing platform selected for our virtual sessions, and I will explain how to use it.  1)             You need to use a webcam or smartphone during the session.  2)             It is important that you be in a quiet, private space that is free of distractions (including cell phone or other devices) during the session.  3)             It is important to use a secure internet connection rather than public/free Wi-Fi.  4)             It is important to be on time. If you need to cancel or change your tele-appointment, you must notify the psychologist in advance by phone or email.  5)             We need a back-up plan (e.g., phone number where you can be reached) to restart the session or to reschedule it, in the event of technical problems.  6)             We need a safety plan that includes at least one emergency contact and the closest emergency room to your location, in the event of a crisis situation.  7)             If you are not an adult, we need the permission of your parent or legal guardian (and  "their contact information) for you to participate in telepsychology sessions.  Understanding and verbal agreement was attested to by the patient.    Jing has requested that her notes be blocked at this time.     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 She described feeling frustrated with student behavior and how she has been managing her own reactions. 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    She stated that she is feeling ready for holidays and that outside of stress at school, \"everything is good.\" She shared her plans for the holidays.    She stated that she and her  are planning to start trying to become pregnant again. She noted that her cycle is still regulating from her miscarriage. She stated that she has been getting sad when interacting with others about children and babies. She continued to process grief from her pregnancy loss. She stated that she is doing well with this and \"coping,\" which has helped her to process her experiences.     Action plan: prepare for holidays; practice self-care over the holidays; continued: cope with pregnancy loss; identify what it would mean to be \"emotionally ready\" for another pregnancy; cope with recent stressors.    She denies SI/HI/AVH. We will follow-up in January.   Identified goals/objectives: coping with grief; continue to engage in activities that bring ravi and relief from stress; Identify expectations for family planning and parenthood; utilize anxiety management strategies.   Response to therapy: Engaged  Treatment plan and process: Continue with above stated tx goals; Process family and relationship dynamics; [place] setting; continued review of anxiety management   "

## 2024-12-23 ENCOUNTER — APPOINTMENT (OUTPATIENT)
Dept: BEHAVIORAL HEALTH | Facility: CLINIC | Age: 29
End: 2024-12-23
Payer: COMMERCIAL

## 2025-01-14 ENCOUNTER — TELEPHONE (OUTPATIENT)
Dept: OBSTETRICS AND GYNECOLOGY | Facility: CLINIC | Age: 30
End: 2025-01-14
Payer: COMMERCIAL

## 2025-01-14 DIAGNOSIS — Z87.59 HISTORY OF MISCARRIAGE: Primary | ICD-10-CM

## 2025-01-14 DIAGNOSIS — Z32.01 ENCOUNTER FOR PREGNANCY TEST, RESULT POSITIVE (HHS-HCC): ICD-10-CM

## 2025-01-14 NOTE — TELEPHONE ENCOUNTER
Patient took pregnancy test and was positive  stated this is after a previous sab and wanted to know if needed any labs  or ultrasound  prior to begin seen in February?

## 2025-01-15 NOTE — TELEPHONE ENCOUNTER
Can order hcg quant q48hrs to make sure numbers are rising like they should; then can order an early US to confirm looking good, once >1500 level of hcg

## 2025-01-16 ENCOUNTER — LAB (OUTPATIENT)
Dept: LAB | Facility: LAB | Age: 30
End: 2025-01-16
Payer: COMMERCIAL

## 2025-01-16 DIAGNOSIS — Z87.59 HISTORY OF MISCARRIAGE: ICD-10-CM

## 2025-01-16 DIAGNOSIS — Z32.01 ENCOUNTER FOR PREGNANCY TEST, RESULT POSITIVE (HHS-HCC): ICD-10-CM

## 2025-01-16 LAB — B-HCG SERPL-ACNC: 235 MIU/ML

## 2025-01-16 PROCEDURE — 84702 CHORIONIC GONADOTROPIN TEST: CPT

## 2025-01-18 ENCOUNTER — LAB (OUTPATIENT)
Dept: LAB | Facility: LAB | Age: 30
End: 2025-01-18
Payer: COMMERCIAL

## 2025-01-18 DIAGNOSIS — Z32.01 ENCOUNTER FOR PREGNANCY TEST, RESULT POSITIVE (HHS-HCC): ICD-10-CM

## 2025-01-18 DIAGNOSIS — E78.2 MIXED HYPERLIPIDEMIA: ICD-10-CM

## 2025-01-18 DIAGNOSIS — R53.83 OTHER FATIGUE: ICD-10-CM

## 2025-01-18 DIAGNOSIS — E55.9 VITAMIN D DEFICIENCY: ICD-10-CM

## 2025-01-18 DIAGNOSIS — Z87.59 HISTORY OF MISCARRIAGE: ICD-10-CM

## 2025-01-18 LAB
ALBUMIN SERPL BCP-MCNC: 4.3 G/DL (ref 3.4–5)
ALP SERPL-CCNC: 71 U/L (ref 33–110)
ALT SERPL W P-5'-P-CCNC: 18 U/L (ref 7–45)
ANION GAP SERPL CALCULATED.3IONS-SCNC: 13 MMOL/L (ref 10–20)
AST SERPL W P-5'-P-CCNC: 12 U/L (ref 9–39)
B-HCG SERPL-ACNC: 469 MIU/ML
BASOPHILS # BLD AUTO: 0.03 X10*3/UL (ref 0–0.1)
BASOPHILS NFR BLD AUTO: 0.3 %
BILIRUB SERPL-MCNC: 0.4 MG/DL (ref 0–1.2)
BUN SERPL-MCNC: 9 MG/DL (ref 6–23)
CALCIUM SERPL-MCNC: 8.8 MG/DL (ref 8.6–10.3)
CHLORIDE SERPL-SCNC: 104 MMOL/L (ref 98–107)
CHOLEST SERPL-MCNC: 225 MG/DL (ref 0–199)
CHOLEST/HDLC SERPL: 3.2 {RATIO}
CO2 SERPL-SCNC: 24 MMOL/L (ref 21–32)
CREAT SERPL-MCNC: 0.54 MG/DL (ref 0.5–1.05)
EGFRCR SERPLBLD CKD-EPI 2021: >90 ML/MIN/1.73M*2
EOSINOPHIL # BLD AUTO: 0.17 X10*3/UL (ref 0–0.7)
EOSINOPHIL NFR BLD AUTO: 1.9 %
ERYTHROCYTE [DISTWIDTH] IN BLOOD BY AUTOMATED COUNT: 14 % (ref 11.5–14.5)
GLUCOSE SERPL-MCNC: 94 MG/DL (ref 74–99)
HCT VFR BLD AUTO: 39.2 % (ref 36–46)
HDLC SERPL-MCNC: 70.5 MG/DL
HGB BLD-MCNC: 12.9 G/DL (ref 12–16)
IMM GRANULOCYTES # BLD AUTO: 0.02 X10*3/UL (ref 0–0.7)
IMM GRANULOCYTES NFR BLD AUTO: 0.2 % (ref 0–0.9)
LDLC SERPL CALC-MCNC: 142 MG/DL
LYMPHOCYTES # BLD AUTO: 1.96 X10*3/UL (ref 1.2–4.8)
LYMPHOCYTES NFR BLD AUTO: 22.1 %
MCH RBC QN AUTO: 26.4 PG (ref 26–34)
MCHC RBC AUTO-ENTMCNC: 32.9 G/DL (ref 32–36)
MCV RBC AUTO: 80 FL (ref 80–100)
MONOCYTES # BLD AUTO: 0.6 X10*3/UL (ref 0.1–1)
MONOCYTES NFR BLD AUTO: 6.8 %
NEUTROPHILS # BLD AUTO: 6.09 X10*3/UL (ref 1.2–7.7)
NEUTROPHILS NFR BLD AUTO: 68.7 %
NON HDL CHOLESTEROL: 155 MG/DL (ref 0–149)
NRBC BLD-RTO: 0 /100 WBCS (ref 0–0)
PLATELET # BLD AUTO: 369 X10*3/UL (ref 150–450)
POTASSIUM SERPL-SCNC: 4.2 MMOL/L (ref 3.5–5.3)
PROT SERPL-MCNC: 6.7 G/DL (ref 6.4–8.2)
RBC # BLD AUTO: 4.88 X10*6/UL (ref 4–5.2)
SODIUM SERPL-SCNC: 137 MMOL/L (ref 136–145)
TRIGL SERPL-MCNC: 63 MG/DL (ref 0–149)
TSH SERPL-ACNC: 1.33 MIU/L (ref 0.44–3.98)
VLDL: 13 MG/DL (ref 0–40)
WBC # BLD AUTO: 8.9 X10*3/UL (ref 4.4–11.3)

## 2025-01-18 PROCEDURE — 84443 ASSAY THYROID STIM HORMONE: CPT

## 2025-01-18 PROCEDURE — 82306 VITAMIN D 25 HYDROXY: CPT

## 2025-01-18 PROCEDURE — 85025 COMPLETE CBC W/AUTO DIFF WBC: CPT

## 2025-01-18 PROCEDURE — 84702 CHORIONIC GONADOTROPIN TEST: CPT

## 2025-01-18 PROCEDURE — 82607 VITAMIN B-12: CPT

## 2025-01-18 PROCEDURE — 80053 COMPREHEN METABOLIC PANEL: CPT

## 2025-01-18 PROCEDURE — 80061 LIPID PANEL: CPT

## 2025-01-19 LAB
25(OH)D3 SERPL-MCNC: 21 NG/ML (ref 30–100)
VIT B12 SERPL-MCNC: 441 PG/ML (ref 211–911)

## 2025-01-19 RX ORDER — ERGOCALCIFEROL 1.25 MG/1
50000 CAPSULE ORAL
Qty: 12 CAPSULE | Refills: 0 | Status: SHIPPED | OUTPATIENT
Start: 2025-01-19 | End: 2025-04-13

## 2025-01-20 ENCOUNTER — LAB (OUTPATIENT)
Dept: LAB | Facility: LAB | Age: 30
End: 2025-01-20
Payer: COMMERCIAL

## 2025-01-20 DIAGNOSIS — Z87.59 HISTORY OF MISCARRIAGE: ICD-10-CM

## 2025-01-20 DIAGNOSIS — Z32.01 ENCOUNTER FOR PREGNANCY TEST, RESULT POSITIVE (HHS-HCC): ICD-10-CM

## 2025-01-20 LAB — B-HCG SERPL-ACNC: 1136 MIU/ML

## 2025-01-20 PROCEDURE — 84702 CHORIONIC GONADOTROPIN TEST: CPT

## 2025-01-21 DIAGNOSIS — Z87.59 HISTORY OF MISCARRIAGE: Primary | ICD-10-CM

## 2025-01-21 DIAGNOSIS — Z32.01 ENCOUNTER FOR PREGNANCY TEST, RESULT POSITIVE (HHS-HCC): ICD-10-CM

## 2025-01-29 ENCOUNTER — APPOINTMENT (OUTPATIENT)
Dept: BEHAVIORAL HEALTH | Facility: CLINIC | Age: 30
End: 2025-01-29
Payer: COMMERCIAL

## 2025-01-29 DIAGNOSIS — F41.1 GAD (GENERALIZED ANXIETY DISORDER): ICD-10-CM

## 2025-01-29 PROCEDURE — 90837 PSYTX W PT 60 MINUTES: CPT | Performed by: PSYCHOLOGIST

## 2025-01-29 NOTE — PROGRESS NOTES
Start time: 4:31pm  End time: 5:26pm      The patient was informed of the current need to conduct treatment via telephone or telehealth due to Covid-19 pandemic. Patient consented to the use of the platform that may not be HIPAA compliant. I have confirmed the patient's identity via the following (minimum of three) acceptable identifiers as per  Policy PH-9:   1. Last 4 of social  2.   3. Address    Telephone/Televideo Informed Consent for Psychotherapy was reviewed with the patient as follows:  There are potential benefits and risks of the use of telephone or video-conferencing that differ from in-person sessions. Specifically, the telephone or televideo system we are using may not be HIPAA compliant and may present limits to patient confidentiality. Confidentiality still applies for telepsychology services, and nobody will record the session without your permission. You agree to use the telephone or video-conferencing platform selected for our virtual sessions, and I will explain how to use it.  1)             You need to use a webcam or smartphone during the session.  2)             It is important that you be in a quiet, private space that is free of distractions (including cell phone or other devices) during the session.  3)             It is important to use a secure internet connection rather than public/free Wi-Fi.  4)             It is important to be on time. If you need to cancel or change your tele-appointment, you must notify the psychologist in advance by phone or email.  5)             We need a back-up plan (e.g., phone number where you can be reached) to restart the session or to reschedule it, in the event of technical problems.  6)             We need a safety plan that includes at least one emergency contact and the closest emergency room to your location, in the event of a crisis situation.  7)             If you are not an adult, we need the permission of your parent or legal guardian (and  "their contact information) for you to participate in telepsychology sessions.  Understanding and verbal agreement was attested to by the patient.    Jing has requested that her notes be blocked at this time.     Reason for care: Anxiety  Method of therapy: Supportive  Therapy summary: Jing processed becoming pregnant. She stated that she is having a lot of symptoms related to pregnancy. She discussed how she has been feeling. She noted that she has been trying to \"stay positive.\" She was tearful while discussing some fears related to pregnancy loss. She described feeling some anxiety about her doctor's appointments.     She processed work related stressors and attempting to keep her stress levels down. She detailed what she is doing for stress relief. She has been spending more time with co-workers as well as setting healthy boundaries when needed.     She was tearful through session while discussing her fears about pregnancy loss and reminders from her past loss. We reviewed validating self-talk and seeking support when needed. She is engaging in more self-care and attending appointments as needed.    Action plan: cope with pregnancy loss as well as stress stemming from new pregnancy; setting boundaries with family and friends     She denies SI/HI/AVH. We will follow-up in three weeks.   Identified goals/objectives: coping with grief; continue to engage in activities that bring ravi and relief from stress; Identify expectations for family planning and parenthood; utilize anxiety management strategies.   Response to therapy: Engaged  Treatment plan and process: Continue with above stated tx goals; Process family and relationship dynamics; [place] setting; continued review of anxiety management   "

## 2025-02-06 ENCOUNTER — HOSPITAL ENCOUNTER (OUTPATIENT)
Dept: RADIOLOGY | Facility: HOSPITAL | Age: 30
Discharge: HOME | End: 2025-02-06
Payer: COMMERCIAL

## 2025-02-06 DIAGNOSIS — Z87.59 HISTORY OF MISCARRIAGE: ICD-10-CM

## 2025-02-06 DIAGNOSIS — Z32.01 ENCOUNTER FOR PREGNANCY TEST, RESULT POSITIVE (HHS-HCC): ICD-10-CM

## 2025-02-06 PROCEDURE — 76801 OB US < 14 WKS SINGLE FETUS: CPT

## 2025-02-13 ENCOUNTER — INITIAL PRENATAL (OUTPATIENT)
Dept: OBSTETRICS AND GYNECOLOGY | Facility: CLINIC | Age: 30
End: 2025-02-13
Payer: COMMERCIAL

## 2025-02-13 VITALS
HEART RATE: 112 BPM | BODY MASS INDEX: 40.55 KG/M2 | DIASTOLIC BLOOD PRESSURE: 77 MMHG | HEIGHT: 61 IN | SYSTOLIC BLOOD PRESSURE: 113 MMHG | WEIGHT: 214.8 LBS | OXYGEN SATURATION: 99 %

## 2025-02-13 DIAGNOSIS — Z34.90 PRENATAL CARE, ANTEPARTUM (HHS-HCC): ICD-10-CM

## 2025-02-13 DIAGNOSIS — Z13.79 GENETIC TESTING: ICD-10-CM

## 2025-02-13 DIAGNOSIS — Z34.80 SUPERVISION OF OTHER NORMAL PREGNANCY, ANTEPARTUM (HHS-HCC): Primary | ICD-10-CM

## 2025-02-13 LAB
POC APPEARANCE, URINE: CLEAR
POC BILIRUBIN, URINE: NEGATIVE
POC BLOOD, URINE: NEGATIVE
POC COLOR, URINE: YELLOW
POC GLUCOSE, URINE: NEGATIVE MG/DL
POC KETONES, URINE: ABNORMAL MG/DL
POC LEUKOCYTES, URINE: NEGATIVE
POC NITRITE,URINE: NEGATIVE
POC PH, URINE: 6 PH
POC PROTEIN, URINE: NEGATIVE MG/DL
POC SPECIFIC GRAVITY, URINE: 1.02
POC UROBILINOGEN, URINE: 0.2 EU/DL

## 2025-02-13 PROCEDURE — 0500F INITIAL PRENATAL CARE VISIT: CPT | Performed by: OBSTETRICS & GYNECOLOGY

## 2025-02-13 PROCEDURE — 81003 URINALYSIS AUTO W/O SCOPE: CPT | Mod: QW | Performed by: OBSTETRICS & GYNECOLOGY

## 2025-02-13 ASSESSMENT — SOCIAL DETERMINANTS OF HEALTH (SDOH)

## 2025-02-13 ASSESSMENT — ENCOUNTER SYMPTOMS
OCCASIONAL FEELINGS OF UNSTEADINESS: 0
LOSS OF SENSATION IN FEET: 0
DEPRESSION: 0

## 2025-02-13 ASSESSMENT — LIFESTYLE VARIABLES
HOW OFTEN DO YOU HAVE A DRINK CONTAINING ALCOHOL: NEVER
HOW OFTEN DO YOU HAVE SIX OR MORE DRINKS ON ONE OCCASION: NEVER
HOW MANY STANDARD DRINKS CONTAINING ALCOHOL DO YOU HAVE ON A TYPICAL DAY: PATIENT DOES NOT DRINK
SKIP TO QUESTIONS 9-10: 1
AUDIT-C TOTAL SCORE: 0

## 2025-02-13 ASSESSMENT — PATIENT HEALTH QUESTIONNAIRE - PHQ9
1. LITTLE INTEREST OR PLEASURE IN DOING THINGS: NOT AT ALL
SUM OF ALL RESPONSES TO PHQ9 QUESTIONS 1 & 2: 0
2. FEELING DOWN, DEPRESSED OR HOPELESS: NOT AT ALL

## 2025-02-13 ASSESSMENT — PAIN SCALES - GENERAL: PAINLEVEL_OUTOF10: 0 - NO PAIN

## 2025-02-13 ASSESSMENT — PAIN - FUNCTIONAL ASSESSMENT: PAIN_FUNCTIONAL_ASSESSMENT: 0-10

## 2025-02-13 NOTE — PROGRESS NOTES
Subjective   Patient ID [id number]   Jing Morrison is a 29 y.o.  at 8w4d with a working estimated date of delivery of 2025, by Last Menstrual Period who presents for an initial prenatal visit.     Her pregnancy is complicated by:  Rh neg, rhogam at 28wks  Initial BMI >40, NSTs at 34wks        OB History    Para Term  AB Living   2       1     SAB IAB Ectopic Multiple Live Births   1              # Outcome Date GA Lbr Antoni/2nd Weight Sex Type Anes PTL Lv   2 Current            1 SAB                   Objective   Physical Exam  Weight: 97.4 kg (214 lb 12.8 oz)  Pregravid BMI: Could not be calculated  BP: 113/77          PROCEDURE:  OB/GYN Transvaginal U/S    Intrauterine - yes  Yolk Sac - yes  Fetal Pole- single  FHT  yes  EDC  2025  CRL  8w0d  Impression: single live      OBGyn Exam    General Physical Exam    HEENT: normal Heart: normal Skin: normal   Thyroid: normal Lungs: normal Extremities: normal   Lymph Nodes: normal Breasts: normal Neurological: normal   Abdomen: normal        Pelvic Exam    Vulva: normal Vagina: normal   Cervix: normal Adnexa: normal   Rectum: normal Spines: average   Subpubic Arch: normal       Prenatal Labs  Results for orders placed or performed in visit on 25   POCT UA Automated manually resulted    Collection Time: 25 10:04 AM   Result Value Ref Range    POC Color, Urine Yellow Straw, Yellow, Light-Yellow    POC Appearance, Urine Clear Clear    POC Glucose, Urine NEGATIVE NEGATIVE mg/dl    POC Bilirubin, Urine NEGATIVE NEGATIVE    POC Ketones, Urine TRACE (A) NEGATIVE mg/dl    POC Specific Gravity, Urine 1.025 1.005 - 1.035    POC Blood, Urine NEGATIVE NEGATIVE    POC PH, Urine 6.0 No Reference Range Established PH    POC Protein, Urine NEGATIVE NEGATIVE mg/dl    POC Urobilinogen, Urine 0.2 0.2, 1.0 EU/DL    Poc Nitrite, Urine NEGATIVE NEGATIVE    POC Leukocytes, Urine NEGATIVE NEGATIVE         Assessment/Plan   Problem List Items Addressed This  Visit    None  Visit Diagnoses         Codes    Supervision of other normal pregnancy, antepartum ([facility])    -  Primary Z34.80    Relevant Orders    POCT UA Automated manually resulted (Completed)    C. trachomatis / N. gonorrhoeae, Amplified, Urogenital    Urine culture    CBC Anemia Panel With Reflex,Pregnancy    Hepatitis B surface antigen    Hepatitis C antibody    HIV 1/2 Antigen/Antibody Screen with Reflex to Confirmation    Rubella Antibody, IgG    Syphilis Screen with Reflex    Hgb  A1C    Type And Screen Is this order related to pregnancy or an upcoming surgery? No    Varicella Zoster Antibody, IgG    Prenatal care, antepartum ([facility])     Z34.90    Genetic testing     Z13.79    Relevant Orders    [place] Prenatal Screen    [facility] [place] Carrier Screen            Immunizations: discussed; already recd this season  Prenatal Labs ordered  Daily prenatal vitamins prescribed  First trimester screening and second trimester screening discussed.  Follow up in 4 weeks for return OB visit.

## 2025-02-15 LAB
BACTERIA UR CULT: NORMAL
C TRACH RRNA SPEC QL NAA+PROBE: NOT DETECTED
N GONORRHOEA RRNA SPEC QL NAA+PROBE: NOT DETECTED
QUEST GC CT AMPLIFIED (ALWAYS MESSAGE): NORMAL

## 2025-02-19 ENCOUNTER — APPOINTMENT (OUTPATIENT)
Dept: BEHAVIORAL HEALTH | Facility: CLINIC | Age: 30
End: 2025-02-19
Payer: COMMERCIAL

## 2025-02-19 DIAGNOSIS — F41.1 GAD (GENERALIZED ANXIETY DISORDER): ICD-10-CM

## 2025-02-19 PROCEDURE — 90834 PSYTX W PT 45 MINUTES: CPT | Performed by: PSYCHOLOGIST

## 2025-02-19 NOTE — PROGRESS NOTES
Start time: 4:32pm  End time: 5:14pm      The patient was informed of the current need to conduct treatment via telephone or telehealth due to Covid-19 pandemic. Patient consented to the use of the platform that may not be HIPAA compliant. I have confirmed the patient's identity via the following (minimum of three) acceptable identifiers as per  Policy PH-9:   1. Last 4 of social  2.   3. Address    Telephone/Televideo Informed Consent for Psychotherapy was reviewed with the patient as follows:  There are potential benefits and risks of the use of telephone or video-conferencing that differ from in-person sessions. Specifically, the telephone or televideo system we are using may not be HIPAA compliant and may present limits to patient confidentiality. Confidentiality still applies for telepsychology services, and nobody will record the session without your permission. You agree to use the telephone or video-conferencing platform selected for our virtual sessions, and I will explain how to use it.  1)             You need to use a webcam or smartphone during the session.  2)             It is important that you be in a quiet, private space that is free of distractions (including cell phone or other devices) during the session.  3)             It is important to use a secure internet connection rather than public/free Wi-Fi.  4)             It is important to be on time. If you need to cancel or change your tele-appointment, you must notify the psychologist in advance by phone or email.  5)             We need a back-up plan (e.g., phone number where you can be reached) to restart the session or to reschedule it, in the event of technical problems.  6)             We need a safety plan that includes at least one emergency contact and the closest emergency room to your location, in the event of a crisis situation.  7)             If you are not an adult, we need the permission of your parent or legal guardian (and  "their contact information) for you to participate in telepsychology sessions.  Understanding and verbal agreement was attested to by the patient.    Jing has requested that her notes be blocked at this time.     Reason for care: Anxiety  Method of therapy: Supportive  Therapy summary: Jing reported that her \"pregnancy symptoms have been kicking in.\" About one week ago she met with her OB and had an ultrasound. She stated that things are \"going like they should be.\" She noted that it has been difficult not telling anyone just yet. She reported that they did share with her parents and her in-laws.     She continued to process work-related stressors and stress management. She has been knitting and spending time with her partner and family. She has also been talking with her OBGYN about medication changes while pregnant. She denies recent panic attacks. She noted some anxiety prior to ultrasound but found that this reduced over time. She was less tearful during session and reported that she feels she is \"doing well.\"     Action plan: continue stress management activities of knitting, spending time with family and mindfulness skills    She denies SI/HI/AVH. We will follow-up in one month.   Identified goals/objectives: coping with grief; continue to engage in activities that bring ravi and relief from stress; Identify expectations for family planning and parenthood; utilize anxiety management strategies.   Response to therapy: Engaged  Treatment plan and process: Continue with above stated tx goals; Process family and relationship dynamics; [place] setting; continued review of anxiety management   "

## 2025-03-13 ENCOUNTER — APPOINTMENT (OUTPATIENT)
Dept: LAB | Facility: HOSPITAL | Age: 30
End: 2025-03-13
Payer: COMMERCIAL

## 2025-03-13 ENCOUNTER — ROUTINE PRENATAL (OUTPATIENT)
Dept: OBSTETRICS AND GYNECOLOGY | Facility: CLINIC | Age: 30
End: 2025-03-13
Payer: COMMERCIAL

## 2025-03-13 ENCOUNTER — HOSPITAL ENCOUNTER (OUTPATIENT)
Dept: RADIOLOGY | Facility: CLINIC | Age: 30
Discharge: HOME | End: 2025-03-13
Payer: COMMERCIAL

## 2025-03-13 VITALS
DIASTOLIC BLOOD PRESSURE: 88 MMHG | HEIGHT: 61 IN | WEIGHT: 215.2 LBS | OXYGEN SATURATION: 97 % | HEART RATE: 130 BPM | SYSTOLIC BLOOD PRESSURE: 127 MMHG | BODY MASS INDEX: 40.63 KG/M2

## 2025-03-13 DIAGNOSIS — Z87.59 HISTORY OF MISCARRIAGE: ICD-10-CM

## 2025-03-13 DIAGNOSIS — Z34.80 SUPERVISION OF OTHER NORMAL PREGNANCY, ANTEPARTUM (HHS-HCC): Primary | ICD-10-CM

## 2025-03-13 DIAGNOSIS — Z32.01 ENCOUNTER FOR PREGNANCY TEST, RESULT POSITIVE (HHS-HCC): ICD-10-CM

## 2025-03-13 DIAGNOSIS — Z3A.12 12 WEEKS GESTATION OF PREGNANCY (HHS-HCC): ICD-10-CM

## 2025-03-13 LAB
POC APPEARANCE, URINE: CLEAR
POC BILIRUBIN, URINE: NEGATIVE
POC BLOOD, URINE: NEGATIVE
POC COLOR, URINE: YELLOW
POC GLUCOSE, URINE: NEGATIVE MG/DL
POC KETONES, URINE: NEGATIVE MG/DL
POC LEUKOCYTES, URINE: NEGATIVE
POC NITRITE,URINE: NEGATIVE
POC PH, URINE: 7 PH
POC PROTEIN, URINE: ABNORMAL MG/DL
POC SPECIFIC GRAVITY, URINE: 1.02
POC UROBILINOGEN, URINE: 0.2 EU/DL

## 2025-03-13 PROCEDURE — 86900 BLOOD TYPING SEROLOGIC ABO: CPT

## 2025-03-13 PROCEDURE — 81003 URINALYSIS AUTO W/O SCOPE: CPT | Mod: QW | Performed by: OBSTETRICS & GYNECOLOGY

## 2025-03-13 PROCEDURE — 86901 BLOOD TYPING SEROLOGIC RH(D): CPT

## 2025-03-13 PROCEDURE — 85027 COMPLETE CBC AUTOMATED: CPT

## 2025-03-13 PROCEDURE — 76816 OB US FOLLOW-UP PER FETUS: CPT

## 2025-03-13 PROCEDURE — 86850 RBC ANTIBODY SCREEN: CPT

## 2025-03-13 PROCEDURE — 0501F PRENATAL FLOW SHEET: CPT | Performed by: OBSTETRICS & GYNECOLOGY

## 2025-03-13 ASSESSMENT — PATIENT HEALTH QUESTIONNAIRE - PHQ9
1. LITTLE INTEREST OR PLEASURE IN DOING THINGS: NOT AT ALL
2. FEELING DOWN, DEPRESSED OR HOPELESS: NOT AT ALL
SUM OF ALL RESPONSES TO PHQ9 QUESTIONS 1 & 2: 0

## 2025-03-13 ASSESSMENT — LIFESTYLE VARIABLES
HOW OFTEN DO YOU HAVE A DRINK CONTAINING ALCOHOL: NEVER
AUDIT-C TOTAL SCORE: 0
HOW MANY STANDARD DRINKS CONTAINING ALCOHOL DO YOU HAVE ON A TYPICAL DAY: PATIENT DOES NOT DRINK
HOW OFTEN DO YOU HAVE SIX OR MORE DRINKS ON ONE OCCASION: NEVER
SKIP TO QUESTIONS 9-10: 1

## 2025-03-13 ASSESSMENT — ENCOUNTER SYMPTOMS
OCCASIONAL FEELINGS OF UNSTEADINESS: 0
DEPRESSION: 0
LOSS OF SENSATION IN FEET: 0

## 2025-03-13 ASSESSMENT — PAIN SCALES - GENERAL: PAINLEVEL_OUTOF10: 0 - NO PAIN

## 2025-03-13 ASSESSMENT — PAIN - FUNCTIONAL ASSESSMENT: PAIN_FUNCTIONAL_ASSESSMENT: 0-10

## 2025-03-13 NOTE — PROGRESS NOTES
Subjective   Patient ID [id number]   Jing Morrison is a 29 y.o.  at 12w4d  with a working estimated date of delivery of 2025, by Last Menstrual Period who presents for a routine prenatal visit.     Her pregnancy is complicated by:  Rh neg, rhogam at 28wks  Initial BMI >40, NSTs at 34wks  Increased risk of preE, start asa at 12-16wks    Objective   Physical Exam  Weight: 97.6 kg (215 lb 3.2 oz), Pregravid BMI: 40.46  BP: 127/88  Fetal Heart Rate: 165               Prenatal Labs  Urine dip:  Results for orders placed or performed in visit on 25   Urine culture    Collection Time: 25 10:04 AM    Specimen: Clean Catch/Voided; Urine   Result Value Ref Range    CULTURE, URINE, ROUTINE SEE NOTE    C. trachomatis / N. gonorrhoeae, Amplified, Urogenital    Collection Time: 25 10:04 AM   Result Value Ref Range    CHLAMYDIA TRACHOMATIS RNA, TMA, UROGENITAL NOT DETECTED NOT DETECTED    NEISSERIA GONORRHOEAE RNA, TMA, UROGENITAL NOT DETECTED NOT DETECTED    (Always Message)     POCT UA Automated manually resulted    Collection Time: 25 10:04 AM   Result Value Ref Range    POC Color, Urine Yellow Straw, Yellow, Light-Yellow    POC Appearance, Urine Clear Clear    POC Glucose, Urine NEGATIVE NEGATIVE mg/dl    POC Bilirubin, Urine NEGATIVE NEGATIVE    POC Ketones, Urine TRACE (A) NEGATIVE mg/dl    POC Specific Gravity, Urine 1.025 1.005 - 1.035    POC Blood, Urine NEGATIVE NEGATIVE    POC PH, Urine 6.0 No Reference Range Established PH    POC Protein, Urine NEGATIVE NEGATIVE mg/dl    POC Urobilinogen, Urine 0.2 0.2, 1.0 EU/DL    Poc Nitrite, Urine NEGATIVE NEGATIVE    POC Leukocytes, Urine NEGATIVE NEGATIVE         Assessment/Plan   Problem List Items Addressed This Visit    None  Visit Diagnoses         Codes    Supervision of other normal pregnancy, antepartum ([facility])    -  Primary Z34.80    Relevant Orders    POCT UA Automated manually resulted (Completed)    Urine culture    12 weeks  gestation of pregnancy ([facility])     Z3A.12            Continue prenatal vitamin.  Labs reviewed.  Follow up in 4 weeks for a routine prenatal visit.

## 2025-03-14 ENCOUNTER — APPOINTMENT (OUTPATIENT)
Dept: LAB | Facility: HOSPITAL | Age: 30
End: 2025-03-14
Payer: COMMERCIAL

## 2025-03-14 LAB
ABO GROUP (TYPE) IN BLOOD: NORMAL
ANTIBODY SCREEN: NORMAL
COMMENTS - MP RESULT TYPE: NORMAL
COMMENTS - MP RESULT TYPE: NORMAL
ERYTHROCYTE [DISTWIDTH] IN BLOOD BY AUTOMATED COUNT: 13.7 % (ref 11.5–14.5)
EST. AVERAGE GLUCOSE BLD GHB EST-MCNC: 114 MG/DL
EST. AVERAGE GLUCOSE BLD GHB EST-SCNC: 6.3 MMOL/L
HBA1C MFR BLD: 5.6 % OF TOTAL HGB
HBV SURFACE AG SERPL QL IA: NORMAL
HCT VFR BLD AUTO: 40.5 % (ref 36–46)
HCV AB SERPL QL IA: NORMAL
HGB BLD-MCNC: 12.5 G/DL (ref 12–16)
HIV 1+2 AB+HIV1 P24 AG SERPL QL IA: NORMAL
MCH RBC QN AUTO: 26.5 PG (ref 26–34)
MCHC RBC AUTO-ENTMCNC: 30.9 G/DL (ref 32–36)
MCV RBC AUTO: 86 FL (ref 80–100)
NRBC BLD-RTO: 0 /100 WBCS (ref 0–0)
PLATELET # BLD AUTO: 367 X10*3/UL (ref 150–450)
RBC # BLD AUTO: 4.72 X10*6/UL (ref 4–5.2)
REFLEX ADDED, ANEMIA PANEL: NORMAL
RH FACTOR (ANTIGEN D): NORMAL
RUBV IGG SERPL IA-ACNC: 1.48 INDEX
SCAN RESULT: NORMAL
SCAN RESULT: NORMAL
T PALLIDUM AB SER QL IA: NORMAL
VZV IGG SER IA-ACNC: 4.86 S/CO
WBC # BLD AUTO: 9 X10*3/UL (ref 4.4–11.3)

## 2025-03-15 LAB — BACTERIA UR CULT: NORMAL

## 2025-03-17 ENCOUNTER — APPOINTMENT (OUTPATIENT)
Dept: BEHAVIORAL HEALTH | Facility: CLINIC | Age: 30
End: 2025-03-17
Payer: COMMERCIAL

## 2025-03-17 DIAGNOSIS — F41.1 GAD (GENERALIZED ANXIETY DISORDER): ICD-10-CM

## 2025-03-17 LAB
EST. AVERAGE GLUCOSE BLD GHB EST-MCNC: 114 MG/DL
EST. AVERAGE GLUCOSE BLD GHB EST-SCNC: 6.3 MMOL/L
HBA1C MFR BLD: 5.6 % OF TOTAL HGB
HBV SURFACE AG SERPL QL IA: NORMAL
HCV AB SERPL QL IA: NORMAL
HIV 1+2 AB+HIV1 P24 AG SERPL QL IA: NORMAL
RUBV IGG SERPL IA-ACNC: 1.48 INDEX
T PALLIDUM AB SER QL IA: NEGATIVE
VZV IGG SER IA-ACNC: 4.86 S/CO

## 2025-03-17 PROCEDURE — 90832 PSYTX W PT 30 MINUTES: CPT | Performed by: PSYCHOLOGIST

## 2025-03-17 NOTE — PROGRESS NOTES
Start time: 4:01pm  End time: 4:33pm      The patient was informed of the current need to conduct treatment via telephone or telehealth due to Covid-19 pandemic. Patient consented to the use of the platform that may not be HIPAA compliant. I have confirmed the patient's identity via the following (minimum of three) acceptable identifiers as per  Policy PH-9:   1. Last 4 of social  2.   3. Address    Telephone/Televideo Informed Consent for Psychotherapy was reviewed with the patient as follows:  There are potential benefits and risks of the use of telephone or video-conferencing that differ from in-person sessions. Specifically, the telephone or televideo system we are using may not be HIPAA compliant and may present limits to patient confidentiality. Confidentiality still applies for telepsychology services, and nobody will record the session without your permission. You agree to use the telephone or video-conferencing platform selected for our virtual sessions, and I will explain how to use it.  1)             You need to use a webcam or smartphone during the session.  2)             It is important that you be in a quiet, private space that is free of distractions (including cell phone or other devices) during the session.  3)             It is important to use a secure internet connection rather than public/free Wi-Fi.  4)             It is important to be on time. If you need to cancel or change your tele-appointment, you must notify the psychologist in advance by phone or email.  5)             We need a back-up plan (e.g., phone number where you can be reached) to restart the session or to reschedule it, in the event of technical problems.  6)             We need a safety plan that includes at least one emergency contact and the closest emergency room to your location, in the event of a crisis situation.  7)             If you are not an adult, we need the permission of your parent or legal guardian (and  "their contact information) for you to participate in telepsychology sessions.  Understanding and verbal agreement was attested to by the patient.    Jing has requested that her notes be blocked at this time.     Reason for care: Anxiety  Method of therapy: Supportive  Therapy summary: Jing reported that today has been a \"rough day.\" She continues to struggle with her schedule at work. She processed work related stressors.     She discussed being at 13 weeks pregnant. She and her  have announced their pregnancy and she reported that her morning sickness has improved. She shared continued boundaries she setting around pregnancy related questions.     She stated that she has been playing with the dog to help her practice stress management. She would like to get back into knitting. She finds that she has been feeling tired and watching movies.     She discussed feeling upset recently about \"having to buy new clothes.\" She processed some increase in anxiety but overall finds that she has been able to manage. She reported that she would like to obtain a journal to document her time during pregnancy.    Action plan: getting back into hobbies such as knitting and spending time with family and friends; taking time for self-care and rest     She denies SI/HI/AVH. We will follow-up in one month.   Identified goals/objectives: coping with loss from miscarriage and adjusting to changes with pregnancy; continue to engage in activities that bring ravi and relief from stress; Identify expectations for family planning and parenthood; utilize anxiety management strategies.   Response to therapy: Engaged  Treatment plan and process: Continue with above stated tx goals; Process family and relationship dynamics; [place] setting; continued review of anxiety management   "

## 2025-03-18 DIAGNOSIS — Z13.79 GENETIC TESTING: Primary | ICD-10-CM

## 2025-03-21 ENCOUNTER — LAB (OUTPATIENT)
Dept: LAB | Facility: HOSPITAL | Age: 30
End: 2025-03-21
Payer: COMMERCIAL

## 2025-03-21 DIAGNOSIS — Z13.79 ENCOUNTER FOR OTHER SCREENING FOR GENETIC AND CHROMOSOMAL ANOMALIES: Primary | ICD-10-CM

## 2025-03-21 LAB
COMMENTS - MP RESULT TYPE: NORMAL
SCAN RESULT: NORMAL

## 2025-03-25 LAB
COMMENTS - MP RESULT TYPE: NORMAL
SCAN RESULT: NORMAL

## 2025-04-14 ENCOUNTER — ROUTINE PRENATAL (OUTPATIENT)
Dept: OBSTETRICS AND GYNECOLOGY | Facility: CLINIC | Age: 30
End: 2025-04-14
Payer: COMMERCIAL

## 2025-04-14 ENCOUNTER — APPOINTMENT (OUTPATIENT)
Dept: BEHAVIORAL HEALTH | Facility: CLINIC | Age: 30
End: 2025-04-14
Payer: COMMERCIAL

## 2025-04-14 VITALS
HEART RATE: 125 BPM | HEIGHT: 61 IN | DIASTOLIC BLOOD PRESSURE: 76 MMHG | WEIGHT: 215.2 LBS | BODY MASS INDEX: 40.63 KG/M2 | SYSTOLIC BLOOD PRESSURE: 111 MMHG

## 2025-04-14 DIAGNOSIS — F41.1 GAD (GENERALIZED ANXIETY DISORDER): ICD-10-CM

## 2025-04-14 DIAGNOSIS — Z34.80 SUPERVISION OF OTHER NORMAL PREGNANCY, ANTEPARTUM (HHS-HCC): Primary | ICD-10-CM

## 2025-04-14 DIAGNOSIS — Z3A.17 17 WEEKS GESTATION OF PREGNANCY (HHS-HCC): ICD-10-CM

## 2025-04-14 LAB
POC APPEARANCE, URINE: CLEAR
POC BILIRUBIN, URINE: NEGATIVE
POC BLOOD, URINE: NEGATIVE
POC COLOR, URINE: YELLOW
POC GLUCOSE, URINE: NEGATIVE MG/DL
POC KETONES, URINE: NEGATIVE MG/DL
POC LEUKOCYTES, URINE: ABNORMAL
POC NITRITE,URINE: NEGATIVE
POC PH, URINE: 5.5 PH
POC PROTEIN, URINE: ABNORMAL MG/DL
POC SPECIFIC GRAVITY, URINE: >=1.03
POC UROBILINOGEN, URINE: 0.2 EU/DL

## 2025-04-14 PROCEDURE — 0501F PRENATAL FLOW SHEET: CPT | Performed by: OBSTETRICS & GYNECOLOGY

## 2025-04-14 PROCEDURE — 81003 URINALYSIS AUTO W/O SCOPE: CPT | Performed by: OBSTETRICS & GYNECOLOGY

## 2025-04-14 PROCEDURE — 90834 PSYTX W PT 45 MINUTES: CPT | Performed by: PSYCHOLOGIST

## 2025-04-14 ASSESSMENT — PAIN SCALES - GENERAL: PAINLEVEL_OUTOF10: 0 - NO PAIN

## 2025-04-14 ASSESSMENT — LIFESTYLE VARIABLES
HOW MANY STANDARD DRINKS CONTAINING ALCOHOL DO YOU HAVE ON A TYPICAL DAY: PATIENT DOES NOT DRINK
HOW OFTEN DO YOU HAVE A DRINK CONTAINING ALCOHOL: NEVER
HOW OFTEN DO YOU HAVE SIX OR MORE DRINKS ON ONE OCCASION: NEVER
AUDIT-C TOTAL SCORE: 0
SKIP TO QUESTIONS 9-10: 1

## 2025-04-14 ASSESSMENT — PATIENT HEALTH QUESTIONNAIRE - PHQ9
SUM OF ALL RESPONSES TO PHQ9 QUESTIONS 1 & 2: 0
2. FEELING DOWN, DEPRESSED OR HOPELESS: NOT AT ALL
1. LITTLE INTEREST OR PLEASURE IN DOING THINGS: NOT AT ALL

## 2025-04-14 ASSESSMENT — ENCOUNTER SYMPTOMS
OCCASIONAL FEELINGS OF UNSTEADINESS: 0
LOSS OF SENSATION IN FEET: 0
DEPRESSION: 0

## 2025-04-14 ASSESSMENT — PAIN - FUNCTIONAL ASSESSMENT: PAIN_FUNCTIONAL_ASSESSMENT: 0-10

## 2025-04-14 NOTE — PROGRESS NOTES
Start time: 3:03pm  End time: 3:52pm      The patient was informed of the current need to conduct treatment via telephone or telehealth due to Covid-19 pandemic. Patient consented to the use of the platform that may not be HIPAA compliant. I have confirmed the patient's identity via the following (minimum of three) acceptable identifiers as per  Policy PH-9:   1. Last 4 of social  2.   3. Address    Telephone/Televideo Informed Consent for Psychotherapy was reviewed with the patient as follows:  There are potential benefits and risks of the use of telephone or video-conferencing that differ from in-person sessions. Specifically, the telephone or televideo system we are using may not be HIPAA compliant and may present limits to patient confidentiality. Confidentiality still applies for telepsychology services, and nobody will record the session without your permission. You agree to use the telephone or video-conferencing platform selected for our virtual sessions, and I will explain how to use it.  1)             You need to use a webcam or smartphone during the session.  2)             It is important that you be in a quiet, private space that is free of distractions (including cell phone or other devices) during the session.  3)             It is important to use a secure internet connection rather than public/free Wi-Fi.  4)             It is important to be on time. If you need to cancel or change your tele-appointment, you must notify the psychologist in advance by phone or email.  5)             We need a back-up plan (e.g., phone number where you can be reached) to restart the session or to reschedule it, in the event of technical problems.  6)             We need a safety plan that includes at least one emergency contact and the closest emergency room to your location, in the event of a crisis situation.  7)             If you are not an adult, we need the permission of your parent or legal guardian (and  "their contact information) for you to participate in telepsychology sessions.  Understanding and verbal agreement was attested to by the patient.    Jing has requested that her notes be blocked at this time.     Reason for care: Anxiety  Method of therapy: Supportive  Therapy summary: Jing reported that she had her OB appointment today and \"everything is good.\" She is currently at 17 weeks and was able to hear her child's heart beat. She found this appointment reassuring. She reported that she is having a baby shower at work this month. She reported that she is doing better with her sickness.     She reported that she has started journaling. She stated that her journal has milestone pages and places to write about things week to week.     She stated that she has been navigating unsolicited advice and opinions from family, friends and co-workers. She stated that she is working on setting boundaries and identifying limits.     Last week she had a classroom observation by her . 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 She discussed how she preparing for this meeting.     She reported that she has been feeling some anxiety around this and has been taking hydroxyzine for increase in anxiety. We reviewed deep breathing and other strategies for anxiety.     Overall, she stated that her mood has been stable. She is struggling with recent work related worry but denies panic. She is continuing to find support in her family and friends and discussed adaptive coping.     Action plan: practicing anxiety management for work related stressors; getting back into hobbies such as knitting and spending time with family and friends; taking time for self-care and rest     She denies SI/HI/AVH. We will follow-up in one month.   Identified goals/objectives: coping with loss from miscarriage and adjusting to changes with pregnancy; continue to engage in activities that " bring ravi and relief from stress; Identify expectations for family planning and parenthood; utilize anxiety management strategies.   Response to therapy: Engaged  Treatment plan and process: Continue with above stated tx goals; Process family and relationship dynamics; [place] setting; continued review of anxiety management

## 2025-04-14 NOTE — PROGRESS NOTES
Subjective   Patient ID [id number]   Jing Morrison is a 29 y.o.  at 17w1d with a working estimated date of delivery of 2025, by Last Menstrual Period who presents for a routine prenatal visit.     Her pregnancy is complicated by:  Rh neg, rhogam at 28wks  Initial BMI >40, NSTs at 34wks  Increased risk of preE, start asa at 12-16wks    Objective   Physical Exam  Weight: 97.6 kg (215 lb 3.2 oz)  Pregravid BMI: 40.46  BP: 111/76                  Prenatal Labs  Urine dip:  Results for orders placed or performed in visit on 25   POCT UA Automated manually resulted    Collection Time: 25  9:31 AM   Result Value Ref Range    POC Color, Urine Yellow Straw, Yellow, Light-Yellow    POC Appearance, Urine Clear Clear    POC Glucose, Urine NEGATIVE NEGATIVE mg/dl    POC Bilirubin, Urine NEGATIVE NEGATIVE    POC Ketones, Urine NEGATIVE NEGATIVE mg/dl    POC Specific Gravity, Urine >=1.030 1.005 - 1.035    POC Blood, Urine NEGATIVE NEGATIVE    POC PH, Urine 5.5 No Reference Range Established PH    POC Protein, Urine 30 (1+) (A) NEGATIVE mg/dl    POC Urobilinogen, Urine 0.2 0.2, 1.0 EU/DL    Poc Nitrite, Urine NEGATIVE NEGATIVE    POC Leukocytes, Urine SMALL (1+) (A) NEGATIVE       Assessment/Plan   Problem List Items Addressed This Visit    None  Visit Diagnoses         Codes    Supervision of other normal pregnancy, antepartum ([facility])    -  Primary Z34.80    Relevant Orders    POCT UA Automated manually resulted (Completed)    17 weeks gestation of pregnancy ([facility])     Z3A.17          Continue prenatal vitamin.  Labs reviewed.  Schedule anatomy ultrasound.  Follow up in 4 weeks for a routine prenatal visit.

## 2025-05-05 ENCOUNTER — APPOINTMENT (OUTPATIENT)
Dept: BEHAVIORAL HEALTH | Facility: CLINIC | Age: 30
End: 2025-05-05
Payer: COMMERCIAL

## 2025-05-05 DIAGNOSIS — F41.1 GAD (GENERALIZED ANXIETY DISORDER): ICD-10-CM

## 2025-05-05 PROCEDURE — 90837 PSYTX W PT 60 MINUTES: CPT | Performed by: PSYCHOLOGIST

## 2025-05-05 NOTE — PROGRESS NOTES
Start time: 4:01pm  End time: 4:55pm      The patient was informed of the current need to conduct treatment via telephone or telehealth due to Covid-19 pandemic. Patient consented to the use of the platform that may not be HIPAA compliant. I have confirmed the patient's identity via the following (minimum of three) acceptable identifiers as per  Policy PH-9:   1. Last 4 of social  2.   3. Address    Telephone/Televideo Informed Consent for Psychotherapy was reviewed with the patient as follows:  There are potential benefits and risks of the use of telephone or video-conferencing that differ from in-person sessions. Specifically, the telephone or televideo system we are using may not be HIPAA compliant and may present limits to patient confidentiality. Confidentiality still applies for telepsychology services, and nobody will record the session without your permission. You agree to use the telephone or video-conferencing platform selected for our virtual sessions, and I will explain how to use it.  1)             You need to use a webcam or smartphone during the session.  2)             It is important that you be in a quiet, private space that is free of distractions (including cell phone or other devices) during the session.  3)             It is important to use a secure internet connection rather than public/free Wi-Fi.  4)             It is important to be on time. If you need to cancel or change your tele-appointment, you must notify the psychologist in advance by phone or email.  5)             We need a back-up plan (e.g., phone number where you can be reached) to restart the session or to reschedule it, in the event of technical problems.  6)             We need a safety plan that includes at least one emergency contact and the closest emergency room to your location, in the event of a crisis situation.  7)             If you are not an adult, we need the permission of your parent or legal guardian (and  their contact information) for you to participate in telepsychology sessions.  Understanding and verbal agreement was attested to by the patient.    Jing has requested that her notes be blocked at this time.     Reason for care: Anxiety  Method of therapy: Supportive  Therapy summary: Jing reported that she is continuing to feel better, physically. She is at 20 weeks in her pregnancy and they will have their anatomy scan soon. She had a work baby shower and is feeling excited about getting things together.     She processed recent challenging family dynamics. She discussed setting boundaries with her in-laws. She discussed finding support from her mother and her partner in processing these dynamics.     She continued to discuss work stressors as well. 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 She discussed coping she is attempting to practice.     Action plan: practicing anxiety management for work and family related stressors; setting boundaries with questions from family and friends; getting back into hobbies such as knitting and spending time with family and friends; taking time for self-care and rest     She denies SI/HI/AVH. We will follow-up in one month.   Identified goals/objectives: adjusting to pregnancy and coping with concerns; continue to engage in activities that bring ravi and relief from stress; Identify expectations for family planning and parenthood; utilize anxiety management strategies.   Response to therapy: Engaged  Treatment plan and process: Continue with above stated tx goals; Process family and relationship dynamics; [place] setting; continued review of anxiety management

## 2025-05-09 ENCOUNTER — APPOINTMENT (OUTPATIENT)
Dept: RADIOLOGY | Facility: CLINIC | Age: 30
End: 2025-05-09
Payer: COMMERCIAL

## 2025-05-12 ENCOUNTER — APPOINTMENT (OUTPATIENT)
Dept: RADIOLOGY | Facility: CLINIC | Age: 30
End: 2025-05-12
Payer: COMMERCIAL

## 2025-05-14 ENCOUNTER — ROUTINE PRENATAL (OUTPATIENT)
Dept: OBSTETRICS AND GYNECOLOGY | Facility: CLINIC | Age: 30
End: 2025-05-14
Payer: COMMERCIAL

## 2025-05-14 ENCOUNTER — HOSPITAL ENCOUNTER (OUTPATIENT)
Dept: RADIOLOGY | Facility: CLINIC | Age: 30
Discharge: HOME | End: 2025-05-14
Payer: COMMERCIAL

## 2025-05-14 VITALS — DIASTOLIC BLOOD PRESSURE: 83 MMHG | SYSTOLIC BLOOD PRESSURE: 130 MMHG | BODY MASS INDEX: 41.63 KG/M2 | WEIGHT: 220.2 LBS

## 2025-05-14 DIAGNOSIS — Z34.80 SUPERVISION OF OTHER NORMAL PREGNANCY, ANTEPARTUM (HHS-HCC): Primary | ICD-10-CM

## 2025-05-14 DIAGNOSIS — Z87.59 HISTORY OF MISCARRIAGE: ICD-10-CM

## 2025-05-14 DIAGNOSIS — Z67.91 RH NEGATIVE STATE IN ANTEPARTUM PERIOD (HHS-HCC): ICD-10-CM

## 2025-05-14 DIAGNOSIS — Z3A.21 21 WEEKS GESTATION OF PREGNANCY (HHS-HCC): ICD-10-CM

## 2025-05-14 DIAGNOSIS — Z32.01 ENCOUNTER FOR PREGNANCY TEST, RESULT POSITIVE (HHS-HCC): ICD-10-CM

## 2025-05-14 DIAGNOSIS — O26.899 RH NEGATIVE STATE IN ANTEPARTUM PERIOD (HHS-HCC): ICD-10-CM

## 2025-05-14 LAB
POC APPEARANCE, URINE: CLEAR
POC BILIRUBIN, URINE: NEGATIVE
POC BLOOD, URINE: NEGATIVE
POC COLOR, URINE: YELLOW
POC GLUCOSE, URINE: NEGATIVE MG/DL
POC KETONES, URINE: NEGATIVE MG/DL
POC LEUKOCYTES, URINE: NEGATIVE
POC NITRITE,URINE: NEGATIVE
POC PH, URINE: 5.5 PH
POC PROTEIN, URINE: NEGATIVE MG/DL
POC SPECIFIC GRAVITY, URINE: 1.02
POC UROBILINOGEN, URINE: 0.2 EU/DL

## 2025-05-14 PROCEDURE — 0501F PRENATAL FLOW SHEET: CPT | Performed by: OBSTETRICS & GYNECOLOGY

## 2025-05-14 PROCEDURE — 76811 OB US DETAILED SNGL FETUS: CPT

## 2025-05-14 PROCEDURE — 81003 URINALYSIS AUTO W/O SCOPE: CPT | Performed by: OBSTETRICS & GYNECOLOGY

## 2025-05-14 ASSESSMENT — LIFESTYLE VARIABLES
HOW OFTEN DO YOU HAVE SIX OR MORE DRINKS ON ONE OCCASION: NEVER
HOW OFTEN DO YOU HAVE A DRINK CONTAINING ALCOHOL: NEVER
AUDIT-C TOTAL SCORE: 0
HOW MANY STANDARD DRINKS CONTAINING ALCOHOL DO YOU HAVE ON A TYPICAL DAY: PATIENT DOES NOT DRINK
SKIP TO QUESTIONS 9-10: 1

## 2025-05-14 ASSESSMENT — ENCOUNTER SYMPTOMS
LOSS OF SENSATION IN FEET: 0
DEPRESSION: 0
OCCASIONAL FEELINGS OF UNSTEADINESS: 0

## 2025-05-14 NOTE — PROGRESS NOTES
Subjective   Patient ID [id number]   Jing Morrison is a 29 y.o.  at 21w3d with a working estimated date of delivery of 2025, by Last Menstrual Period who presents for a routine prenatal visit.     Her pregnancy is complicated by:  Rh neg, rhogam at 28wks  Initial BMI >40, NSTs at 34wks  Increased risk of preE, start asa at 12-16wks    Objective   Physical Exam  Weight: 99.9 kg (220 lb 3.2 oz)  Pregravid BMI: 40.46  BP: 130/83  Fetal Heart Rate: 155 Fundal Height (cm): 21 cm             Prenatal Labs  Urine dip:  Results for orders placed or performed in visit on 25   POCT UA Automated manually resulted    Collection Time: 25  3:08 PM   Result Value Ref Range    POC Color, Urine Yellow Straw, Yellow, Light-Yellow    POC Appearance, Urine Clear Clear    POC Glucose, Urine NEGATIVE NEGATIVE mg/dl    POC Bilirubin, Urine NEGATIVE NEGATIVE    POC Ketones, Urine NEGATIVE NEGATIVE mg/dl    POC Specific Gravity, Urine 1.020 1.005 - 1.035    POC Blood, Urine NEGATIVE NEGATIVE    POC PH, Urine 5.5 No Reference Range Established PH    POC Protein, Urine NEGATIVE NEGATIVE mg/dl    POC Urobilinogen, Urine 0.2 0.2, 1.0 EU/DL    Poc Nitrite, Urine NEGATIVE NEGATIVE    POC Leukocytes, Urine NEGATIVE NEGATIVE       Assessment/Plan   Problem List Items Addressed This Visit    None  Visit Diagnoses         Codes      Supervision of other normal pregnancy, antepartum ([facility])    -  Primary Z34.80    Relevant Orders    POCT UA Automated manually resulted (Completed)    Glucose, 1 Hour Screen, Pregnancy    CBC Anemia Panel With Reflex, Pregnancy      21 weeks gestation of pregnancy ([facility])     Z3A.21      Rh negative state in antepartum period ([facility])     O26.899, Z67.91    Relevant Orders    Abo/Rh          Continue prenatal vitamin.  Labs reviewed.  Rev'd anatomy ultrasound.  Follow up in 4 weeks for a routine prenatal visit.

## 2025-05-28 DIAGNOSIS — Z34.80 SUPERVISION OF OTHER NORMAL PREGNANCY, ANTEPARTUM (HHS-HCC): ICD-10-CM

## 2025-06-03 ENCOUNTER — HOSPITAL ENCOUNTER (OUTPATIENT)
Dept: RADIOLOGY | Facility: CLINIC | Age: 30
Discharge: HOME | End: 2025-06-03
Payer: COMMERCIAL

## 2025-06-03 DIAGNOSIS — Z32.01 ENCOUNTER FOR PREGNANCY TEST, RESULT POSITIVE (HHS-HCC): ICD-10-CM

## 2025-06-03 DIAGNOSIS — Z87.59 HISTORY OF MISCARRIAGE: ICD-10-CM

## 2025-06-03 PROCEDURE — 76816 OB US FOLLOW-UP PER FETUS: CPT

## 2025-06-03 PROCEDURE — 76816 OB US FOLLOW-UP PER FETUS: CPT | Performed by: STUDENT IN AN ORGANIZED HEALTH CARE EDUCATION/TRAINING PROGRAM

## 2025-06-04 ENCOUNTER — APPOINTMENT (OUTPATIENT)
Dept: BEHAVIORAL HEALTH | Facility: CLINIC | Age: 30
End: 2025-06-04
Payer: COMMERCIAL

## 2025-06-04 DIAGNOSIS — F41.1 GAD (GENERALIZED ANXIETY DISORDER): ICD-10-CM

## 2025-06-04 PROCEDURE — 90837 PSYTX W PT 60 MINUTES: CPT | Performed by: PSYCHOLOGIST

## 2025-06-04 NOTE — PROGRESS NOTES
Start time: 3:31pm  End time: 4:24pm      The patient was informed of the current need to conduct treatment via telephone or telehealth due to Covid-19 pandemic. Patient consented to the use of the platform that may not be HIPAA compliant. I have confirmed the patient's identity via the following (minimum of three) acceptable identifiers as per  Policy PH-9:   1. Last 4 of social  2.   3. Address    Telephone/Televideo Informed Consent for Psychotherapy was reviewed with the patient as follows:  There are potential benefits and risks of the use of telephone or video-conferencing that differ from in-person sessions. Specifically, the telephone or televideo system we are using may not be HIPAA compliant and may present limits to patient confidentiality. Confidentiality still applies for telepsychology services, and nobody will record the session without your permission. You agree to use the telephone or video-conferencing platform selected for our virtual sessions, and I will explain how to use it.  1)             You need to use a webcam or smartphone during the session.  2)             It is important that you be in a quiet, private space that is free of distractions (including cell phone or other devices) during the session.  3)             It is important to use a secure internet connection rather than public/free Wi-Fi.  4)             It is important to be on time. If you need to cancel or change your tele-appointment, you must notify the psychologist in advance by phone or email.  5)             We need a back-up plan (e.g., phone number where you can be reached) to restart the session or to reschedule it, in the event of technical problems.  6)             We need a safety plan that includes at least one emergency contact and the closest emergency room to your location, in the event of a crisis situation.  7)             If you are not an adult, we need the permission of your parent or legal guardian (and  their contact information) for you to participate in telepsychology sessions.  Understanding and verbal agreement was attested to by the patient.    Jing has requested that her notes be blocked at this time.     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 She discussed anxiety related to people coming over to her house. However, she reported things are going well with her pregnancy, which helps to reduce her anxiety overall.     She shared work related stressors and discussed challenges she faced near the end of the year. 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     She discussed preparing for the arrival of their first child. She stated that she has been somewhat overwhelmed by decisions that have to be made, but noted that her family and friends have been very supportive.     Action plan: practicing anxiety management strategies as stressors arise; setting boundaries with questions from family and friends; getting back into hobbies such as knitting and spending time with others; taking time for self-care and rest     She denies SI/HI/AVH. We will follow-up in one month.   Identified goals/objectives: adjusting to pregnancy and coping with concerns; continue to engage in activities that bring ravi and relief from stress; Identify expectations for family planning and parenthood; utilize anxiety management strategies.   Response to therapy: Engaged  Treatment plan and process: Continue with above stated tx goals; Process family and relationship dynamics; [place] setting; continued review of anxiety management

## 2025-06-11 ENCOUNTER — LAB (OUTPATIENT)
Dept: LAB | Facility: HOSPITAL | Age: 30
End: 2025-06-11
Payer: COMMERCIAL

## 2025-06-11 ENCOUNTER — ROUTINE PRENATAL (OUTPATIENT)
Dept: OBSTETRICS AND GYNECOLOGY | Facility: CLINIC | Age: 30
End: 2025-06-11
Payer: COMMERCIAL

## 2025-06-11 VITALS
HEIGHT: 61 IN | WEIGHT: 221.8 LBS | SYSTOLIC BLOOD PRESSURE: 121 MMHG | BODY MASS INDEX: 41.88 KG/M2 | DIASTOLIC BLOOD PRESSURE: 86 MMHG

## 2025-06-11 DIAGNOSIS — Z34.80 SUPERVISION OF OTHER NORMAL PREGNANCY, ANTEPARTUM (HHS-HCC): Primary | ICD-10-CM

## 2025-06-11 DIAGNOSIS — O26.899 OTHER SPECIFIED PREGNANCY RELATED CONDITIONS, UNSPECIFIED TRIMESTER (HHS-HCC): ICD-10-CM

## 2025-06-11 DIAGNOSIS — Z3A.25 25 WEEKS GESTATION OF PREGNANCY (HHS-HCC): ICD-10-CM

## 2025-06-11 DIAGNOSIS — Z67.91 UNSPECIFIED BLOOD TYPE, RH NEGATIVE: ICD-10-CM

## 2025-06-11 DIAGNOSIS — Z34.80 ENCOUNTER FOR SUPERVISION OF OTHER NORMAL PREGNANCY, UNSPECIFIED TRIMESTER (HHS-HCC): Primary | ICD-10-CM

## 2025-06-11 LAB
ABO GROUP (TYPE) IN BLOOD: NORMAL
ERYTHROCYTE [DISTWIDTH] IN BLOOD BY AUTOMATED COUNT: 15 % (ref 11.5–14.5)
HCT VFR BLD AUTO: 35.8 % (ref 36–46)
HGB BLD-MCNC: 11 G/DL (ref 12–16)
MCH RBC QN AUTO: 26.6 PG (ref 26–34)
MCHC RBC AUTO-ENTMCNC: 30.7 G/DL (ref 32–36)
MCV RBC AUTO: 87 FL (ref 80–100)
NRBC BLD-RTO: 0 /100 WBCS (ref 0–0)
PLATELET # BLD AUTO: 330 X10*3/UL (ref 150–450)
POC APPEARANCE, URINE: CLEAR
POC BILIRUBIN, URINE: NEGATIVE
POC BLOOD, URINE: NEGATIVE
POC COLOR, URINE: YELLOW
POC GLUCOSE, URINE: NEGATIVE MG/DL
POC KETONES, URINE: NEGATIVE MG/DL
POC LEUKOCYTES, URINE: NEGATIVE
POC NITRITE,URINE: NEGATIVE
POC PH, URINE: 6 PH
POC PROTEIN, URINE: NEGATIVE MG/DL
POC SPECIFIC GRAVITY, URINE: <=1.005
POC UROBILINOGEN, URINE: 0.2 EU/DL
RBC # BLD AUTO: 4.13 X10*6/UL (ref 4–5.2)
RH FACTOR (ANTIGEN D): NORMAL
WBC # BLD AUTO: 11.4 X10*3/UL (ref 4.4–11.3)

## 2025-06-11 PROCEDURE — 86900 BLOOD TYPING SEROLOGIC ABO: CPT

## 2025-06-11 PROCEDURE — 81003 URINALYSIS AUTO W/O SCOPE: CPT | Performed by: OBSTETRICS & GYNECOLOGY

## 2025-06-11 PROCEDURE — 86901 BLOOD TYPING SEROLOGIC RH(D): CPT

## 2025-06-11 PROCEDURE — 36415 COLL VENOUS BLD VENIPUNCTURE: CPT

## 2025-06-11 PROCEDURE — 85027 COMPLETE CBC AUTOMATED: CPT

## 2025-06-11 PROCEDURE — 0501F PRENATAL FLOW SHEET: CPT | Performed by: OBSTETRICS & GYNECOLOGY

## 2025-06-11 ASSESSMENT — LIFESTYLE VARIABLES
HOW OFTEN DO YOU HAVE SIX OR MORE DRINKS ON ONE OCCASION: NEVER
HOW MANY STANDARD DRINKS CONTAINING ALCOHOL DO YOU HAVE ON A TYPICAL DAY: PATIENT DOES NOT DRINK
HOW OFTEN DO YOU HAVE A DRINK CONTAINING ALCOHOL: NEVER
AUDIT-C TOTAL SCORE: 0
SKIP TO QUESTIONS 9-10: 1

## 2025-06-11 ASSESSMENT — PAIN SCALES - GENERAL: PAINLEVEL_OUTOF10: 0 - NO PAIN

## 2025-06-11 ASSESSMENT — ENCOUNTER SYMPTOMS
DEPRESSION: 0
OCCASIONAL FEELINGS OF UNSTEADINESS: 0
LOSS OF SENSATION IN FEET: 0

## 2025-06-11 ASSESSMENT — PAIN - FUNCTIONAL ASSESSMENT: PAIN_FUNCTIONAL_ASSESSMENT: 0-10

## 2025-06-11 NOTE — PROGRESS NOTES
Subjective   Patient ID [id number]   Jing Morrison is a 29 y.o.  at 25w3d with a working estimated date of delivery of 2025, by Last Menstrual Period who presents for a routine prenatal visit. She denies vaginal bleeding, leakage of fluid, decreased fetal movements, or contractions.    Her pregnancy is complicated by:  Rh neg, rhogam at 28wks  Initial BMI >40, NSTs at 34wks  Increased risk of preE, start asa at 12-16wks    Objective   Physical Exam  Weight: 101 kg (221 lb 12.8 oz)  Pregravid BMI: 40.46  BP: 121/86  Fetal Heart Rate: 150 Fundal Height (cm): 24 cm             Prenatal Labs  Urine dip:  Results for orders placed or performed in visit on 25   POCT UA Automated manually resulted    Collection Time: 25 10:10 AM   Result Value Ref Range    POC Color, Urine Yellow Straw, Yellow, Light-Yellow    POC Appearance, Urine Clear Clear    POC Glucose, Urine NEGATIVE NEGATIVE mg/dl    POC Bilirubin, Urine NEGATIVE NEGATIVE    POC Ketones, Urine NEGATIVE NEGATIVE mg/dl    POC Specific Gravity, Urine <=1.005 1.005 - 1.035    POC Blood, Urine NEGATIVE NEGATIVE    POC PH, Urine 6.0 No Reference Range Established PH    POC Protein, Urine NEGATIVE NEGATIVE mg/dl    POC Urobilinogen, Urine 0.2 0.2, 1.0 EU/DL    Poc Nitrite, Urine NEGATIVE NEGATIVE    POC Leukocytes, Urine NEGATIVE NEGATIVE     Imaging rev'd    Assessment/Plan   Problem List Items Addressed This Visit    None  Visit Diagnoses         Codes      Supervision of other normal pregnancy, antepartum ([facility])    -  Primary Z34.80    Relevant Orders    POCT UA Automated manually resulted (Completed)      25 weeks gestation of pregnancy ([facility])     Z3A.25          Continue prenatal vitamin.  Labs reviewed.  Movement counts   labor precautions  Rhogam if indicated  GTT today    Follow up in 2 weeks for a routine prenatal visit.

## 2025-06-12 ENCOUNTER — APPOINTMENT (OUTPATIENT)
Dept: OBSTETRICS AND GYNECOLOGY | Facility: CLINIC | Age: 30
End: 2025-06-12
Payer: COMMERCIAL

## 2025-06-12 DIAGNOSIS — R73.09 GLUCOSE TOLERANCE TEST ABNORMAL: Primary | ICD-10-CM

## 2025-06-12 LAB
GLUCOSE 1H P 50 G GLC PO SERPL-MCNC: 169 MG/DL
REFLEX ADDED, ANEMIA PANEL: NORMAL

## 2025-06-25 ENCOUNTER — APPOINTMENT (OUTPATIENT)
Dept: BEHAVIORAL HEALTH | Facility: CLINIC | Age: 30
End: 2025-06-25
Payer: COMMERCIAL

## 2025-06-25 DIAGNOSIS — F41.1 GAD (GENERALIZED ANXIETY DISORDER): ICD-10-CM

## 2025-06-25 PROCEDURE — 90837 PSYTX W PT 60 MINUTES: CPT | Performed by: PSYCHOLOGIST

## 2025-06-25 NOTE — PROGRESS NOTES
Start time: 12:32pm  End time: 1:26pm      The patient was informed of the current need to conduct treatment via telephone or telehealth due to Covid-19 pandemic. Patient consented to the use of the platform that may not be HIPAA compliant. I have confirmed the patient's identity via the following (minimum of three) acceptable identifiers as per  Policy PH-9:   1. Last 4 of social  2.   3. Address    Telephone/Televideo Informed Consent for Psychotherapy was reviewed with the patient as follows:  There are potential benefits and risks of the use of telephone or video-conferencing that differ from in-person sessions. Specifically, the telephone or televideo system we are using may not be HIPAA compliant and may present limits to patient confidentiality. Confidentiality still applies for telepsychology services, and nobody will record the session without your permission. You agree to use the telephone or video-conferencing platform selected for our virtual sessions, and I will explain how to use it.  1)             You need to use a webcam or smartphone during the session.  2)             It is important that you be in a quiet, private space that is free of distractions (including cell phone or other devices) during the session.  3)             It is important to use a secure internet connection rather than public/free Wi-Fi.  4)             It is important to be on time. If you need to cancel or change your tele-appointment, you must notify the psychologist in advance by phone or email.  5)             We need a back-up plan (e.g., phone number where you can be reached) to restart the session or to reschedule it, in the event of technical problems.  6)             We need a safety plan that includes at least one emergency contact and the closest emergency room to your location, in the event of a crisis situation.  7)             If you are not an adult, we need the permission of your parent or legal guardian  (and their contact information) for you to participate in telepsychology sessions.  Understanding and verbal agreement was attested to by the patient.    Jing has requested that her notes be blocked at this time.     Reason for care: Anxiety  Method of therapy: Supportive  Therapy summary: Jing reported that she has had a busy summer. She has been spending time with friends and recently went on vacation. She noted that she is working on getting the house ready for the baby. She shared that working on this is helping to reduce her stress.     She reported that her cat has been sick and she discussed having to take him to the vet.    She has an upcoming glucose appointment and discussed concerns about this. She reported that she failed her initial glucose test and is worried about the results of the three hour test. She discussed preparing for this.     She has been supporting a friend with recent stressors. 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    Action plan: create coping plan for upcoming testing; practicing anxiety management strategies as stressors arise; practicing self-care and finding time for rest and relaxation    She denies SI/HI/AVH. We will follow-up in 6 weeks.   Identified goals/objectives: adjusting to pregnancy and coping with concerns; continue to engage in activities that bring ravi and relief from stress; Identify expectations for family planning and parenthood; utilize anxiety management strategies.   Response to therapy: Engaged  Treatment plan and process: Continue with above stated tx goals; Process family and relationship dynamics; [place] setting; continued review of anxiety management

## 2025-07-01 ENCOUNTER — CLINICAL SUPPORT (OUTPATIENT)
Dept: OBSTETRICS AND GYNECOLOGY | Facility: CLINIC | Age: 30
End: 2025-07-01
Payer: COMMERCIAL

## 2025-07-01 VITALS — BODY MASS INDEX: 41.21 KG/M2 | WEIGHT: 218 LBS | DIASTOLIC BLOOD PRESSURE: 83 MMHG | SYSTOLIC BLOOD PRESSURE: 116 MMHG

## 2025-07-01 DIAGNOSIS — Z67.91 RH NEGATIVE STATE IN ANTEPARTUM PERIOD (HHS-HCC): Primary | ICD-10-CM

## 2025-07-01 DIAGNOSIS — O26.899 RH NEGATIVE STATE IN ANTEPARTUM PERIOD (HHS-HCC): Primary | ICD-10-CM

## 2025-07-01 PROCEDURE — 96372 THER/PROPH/DIAG INJ SC/IM: CPT | Performed by: OBSTETRICS & GYNECOLOGY

## 2025-07-01 PROCEDURE — 2500000004 HC RX 250 GENERAL PHARMACY W/ HCPCS (ALT 636 FOR OP/ED): Performed by: OBSTETRICS & GYNECOLOGY

## 2025-07-01 RX ADMIN — HUMAN RHO(D) IMMUNE GLOBULIN 300 MCG: 300 INJECTION, SOLUTION INTRAMUSCULAR at 11:05

## 2025-07-04 DIAGNOSIS — O24.410 DIET CONTROLLED GESTATIONAL DIABETES MELLITUS (GDM), ANTEPARTUM (HHS-HCC): Primary | ICD-10-CM

## 2025-07-04 LAB
GLUCOSE 1H P CHAL SERPL-MCNC: 240 MG/DL
GLUCOSE 2H P CHAL SERPL-MCNC: 228 MG/DL
GLUCOSE 3H P 100 G GLC PO SERPL-MCNC: 186 MG/DL
GLUCOSE P FAST SERPL-MCNC: 97 MG/DL (ref 65–94)
SERVICE CMNT-IMP: ABNORMAL

## 2025-07-08 ENCOUNTER — TELEPHONE (OUTPATIENT)
Dept: OBSTETRICS AND GYNECOLOGY | Facility: HOSPITAL | Age: 30
End: 2025-07-08
Payer: COMMERCIAL

## 2025-07-09 ENCOUNTER — ROUTINE PRENATAL (OUTPATIENT)
Dept: OBSTETRICS AND GYNECOLOGY | Facility: CLINIC | Age: 30
End: 2025-07-09
Payer: COMMERCIAL

## 2025-07-09 VITALS — SYSTOLIC BLOOD PRESSURE: 108 MMHG | WEIGHT: 219 LBS | DIASTOLIC BLOOD PRESSURE: 77 MMHG | BODY MASS INDEX: 41.4 KG/M2

## 2025-07-09 DIAGNOSIS — Z3A.29 29 WEEKS GESTATION OF PREGNANCY (HHS-HCC): ICD-10-CM

## 2025-07-09 DIAGNOSIS — O24.410 DIET CONTROLLED GESTATIONAL DIABETES MELLITUS (GDM) IN THIRD TRIMESTER (HHS-HCC): ICD-10-CM

## 2025-07-09 DIAGNOSIS — Z34.80 SUPERVISION OF OTHER NORMAL PREGNANCY, ANTEPARTUM (HHS-HCC): Primary | ICD-10-CM

## 2025-07-09 DIAGNOSIS — Z23 NEED FOR TDAP VACCINATION: ICD-10-CM

## 2025-07-09 LAB
POC APPEARANCE, URINE: CLEAR
POC BILIRUBIN, URINE: NEGATIVE
POC BLOOD, URINE: NEGATIVE
POC COLOR, URINE: YELLOW
POC GLUCOSE, URINE: NEGATIVE MG/DL
POC KETONES, URINE: ABNORMAL MG/DL
POC LEUKOCYTES, URINE: NEGATIVE
POC NITRITE,URINE: NEGATIVE
POC PH, URINE: 6 PH
POC PROTEIN, URINE: NEGATIVE MG/DL
POC SPECIFIC GRAVITY, URINE: >=1.03
POC UROBILINOGEN, URINE: 0.2 EU/DL

## 2025-07-09 PROCEDURE — 81003 URINALYSIS AUTO W/O SCOPE: CPT | Performed by: OBSTETRICS & GYNECOLOGY

## 2025-07-09 PROCEDURE — 0501F PRENATAL FLOW SHEET: CPT | Performed by: OBSTETRICS & GYNECOLOGY

## 2025-07-09 PROCEDURE — 90715 TDAP VACCINE 7 YRS/> IM: CPT | Performed by: OBSTETRICS & GYNECOLOGY

## 2025-07-09 RX ORDER — INSULIN PUMP SYRINGE, 3 ML
EACH MISCELLANEOUS
Qty: 1 EACH | Refills: 0 | Status: SHIPPED | OUTPATIENT
Start: 2025-07-09 | End: 2026-07-09

## 2025-07-09 RX ORDER — INSULIN PUMP SYRINGE, 3 ML
EACH MISCELLANEOUS
Qty: 1 EACH | Refills: 0 | Status: SHIPPED | OUTPATIENT
Start: 2025-07-09

## 2025-07-09 RX ORDER — LANCETS
EACH MISCELLANEOUS
Qty: 200 EACH | Refills: 3 | Status: SHIPPED | OUTPATIENT
Start: 2025-07-09

## 2025-07-09 ASSESSMENT — PAIN - FUNCTIONAL ASSESSMENT: PAIN_FUNCTIONAL_ASSESSMENT: 0-10

## 2025-07-09 ASSESSMENT — PAIN SCALES - GENERAL: PAINLEVEL_OUTOF10: 0 - NO PAIN

## 2025-07-09 ASSESSMENT — ENCOUNTER SYMPTOMS
OCCASIONAL FEELINGS OF UNSTEADINESS: 0
LOSS OF SENSATION IN FEET: 0
DEPRESSION: 0

## 2025-07-09 NOTE — PROGRESS NOTES
Subjective   Patient ID [id number]   Jing Morrison is a 29 y.o.  at 29w3d with a working estimated date of delivery of 2025, by Last Menstrual Period who presents for a routine prenatal visit. She denies vaginal bleeding, leakage of fluid, decreased fetal movements, or contractions.    Her pregnancy is complicated by:  Rh neg, rhogam at 28wks, 25  Initial BMI >40, NSTs at 34wks  Increased risk of preE, start asa at 12-16wks  Tdap 25  GDMA, diet controlled, MFM c/s pend    Objective   Physical Exam  Weight: 99.3 kg (219 lb)  Pregravid BMI: 40.46  BP: 108/77  Fetal Heart Rate: 150 Fundal Height (cm): 30 cm             Prenatal Labs  Urine dip:  Results for orders placed or performed in visit on 25   POCT UA Automated manually resulted    Collection Time: 25  2:04 PM   Result Value Ref Range    POC Color, Urine Yellow Straw, Yellow, Light-Yellow    POC Appearance, Urine Clear Clear    POC Glucose, Urine NEGATIVE NEGATIVE mg/dl    POC Bilirubin, Urine NEGATIVE NEGATIVE    POC Ketones, Urine 15 (1+) (A) NEGATIVE mg/dl    POC Specific Gravity, Urine >=1.030 1.005 - 1.035    POC Blood, Urine NEGATIVE NEGATIVE    POC PH, Urine 6.0 No Reference Range Established PH    POC Protein, Urine NEGATIVE NEGATIVE mg/dl    POC Urobilinogen, Urine 0.2 0.2, 1.0 EU/DL    Poc Nitrite, Urine NEGATIVE NEGATIVE    POC Leukocytes, Urine NEGATIVE NEGATIVE     Imaging rev'd    Assessment/Plan   Problem List Items Addressed This Visit    None  Visit Diagnoses         Codes      Supervision of other normal pregnancy, antepartum ([facility])    -  Primary Z34.80    Relevant Orders    POCT UA Automated manually resulted (Completed)      Diet controlled gestational diabetes mellitus (GDM) in third trimester ([facility])     O24.410    Relevant Medications    Blood glucose monitoring meter    blood glucose control, normal solution    isopropyl alcohoL 70 % towelette    blood sugar diagnostic    lancets misc      29 weeks  gestation of pregnancy ([facility])     Z3A.29      Need for Tdap vaccination     Z23    Relevant Orders    Tdap vaccine, age 7 years and older (Completed)          Continue prenatal vitamin.  Labs reviewed.  Movement counts   labor precautions    Follow up in 2 weeks for a routine prenatal visit.

## 2025-07-15 ENCOUNTER — PATIENT MESSAGE (OUTPATIENT)
Dept: MATERNAL FETAL MEDICINE | Facility: CLINIC | Age: 30
End: 2025-07-15
Payer: COMMERCIAL

## 2025-07-15 ENCOUNTER — HOSPITAL ENCOUNTER (OUTPATIENT)
Dept: RADIOLOGY | Facility: CLINIC | Age: 30
Discharge: HOME | End: 2025-07-15
Payer: COMMERCIAL

## 2025-07-15 DIAGNOSIS — Z87.59 HISTORY OF MISCARRIAGE: ICD-10-CM

## 2025-07-15 DIAGNOSIS — Z03.74 SUSPECTED PROBLEM WITH FETAL GROWTH NOT FOUND: ICD-10-CM

## 2025-07-15 DIAGNOSIS — O99.213 OBESITY AFFECTING PREGNANCY IN THIRD TRIMESTER (HHS-HCC): ICD-10-CM

## 2025-07-15 DIAGNOSIS — O24.419 GESTATIONAL DIABETES MELLITUS (GDM): ICD-10-CM

## 2025-07-15 DIAGNOSIS — Z32.01 ENCOUNTER FOR PREGNANCY TEST, RESULT POSITIVE (HHS-HCC): ICD-10-CM

## 2025-07-15 PROBLEM — O24.410 DIET CONTROLLED GESTATIONAL DIABETES MELLITUS (GDM) IN THIRD TRIMESTER (HHS-HCC): Status: ACTIVE | Noted: 2025-07-15

## 2025-07-15 PROCEDURE — 76816 OB US FOLLOW-UP PER FETUS: CPT

## 2025-07-15 PROCEDURE — 76819 FETAL BIOPHYS PROFIL W/O NST: CPT

## 2025-07-15 PROCEDURE — 76819 FETAL BIOPHYS PROFIL W/O NST: CPT | Performed by: OBSTETRICS & GYNECOLOGY

## 2025-07-15 PROCEDURE — 76816 OB US FOLLOW-UP PER FETUS: CPT | Performed by: OBSTETRICS & GYNECOLOGY

## 2025-07-17 ENCOUNTER — DOCUMENTATION (OUTPATIENT)
Dept: MATERNAL FETAL MEDICINE | Facility: CLINIC | Age: 30
End: 2025-07-17
Payer: COMMERCIAL

## 2025-07-17 NOTE — PROGRESS NOTES
Gestational Diabetes Self-Management VIRTUAL Group Education provided today, 2025     Overview of Diabetes    Type 1    Type 2    Gestational       -Risks to baby and Mom  Self-monitoring     Tips for Testing/troubleshooting glucometers    Goal range for blood sugars (<95 fasting, <140 1 hour postprandial for all meals)    Record Keeping    Blood Sugar Line    Blood Sugar Log Sheets - provided  Managing Diabetes     Physical Activity - recommendation is about 150 minutes per week    Medication        Oral/insulin overview  Additional  testing     NST/BPP/Growth ultrasound - general overview  Postpartum     Expectations in the hospital    Follow up - recommend fasting, 75g OGGT, 6-8 weeks after delivery     50% change of developing GDM in another pregnancy    50% chance of developing T2DM within next 10 years    Up to 30% of patients will have pre-diabetes or T2DM following delivery       Breastfeeding is strongly encouraged   Special considerations, self-management    Hypoglycemia    Hyperglycemia    Sick Day Rules  Resilience training/stress management    Engage your senses    Gratitude practice  Nutrition planning     Identify carbohydrates, serving size, carbohydrate counting    “Free Foods” - very low carbohydrate options    Label Reading    Personalized Meal Plan     3 meals + 3 snacks = approximately 175g carbohydrates/day    Follow up for 1:1 Registered Dietician consult       [phone] to schedule appointment    Individual consult with Maternal Fetal Medicine provider is scheduled.

## 2025-07-21 ENCOUNTER — TELEPHONE (OUTPATIENT)
Dept: MATERNAL FETAL MEDICINE | Facility: CLINIC | Age: 30
End: 2025-07-21
Payer: COMMERCIAL

## 2025-07-21 NOTE — TELEPHONE ENCOUNTER
Communicated with the patient on 7/21/2025  She has Gestational Diabetes @ 31w1d     The patient checks her sugars fasting and 1 hour after meals, and reports them as follows:        Impression:  Having consistent, yet mild elevations with fasting blood sugars. Has been trying different bedtime snacks. Recommend starting small dose bedtime intermediate acting insulin for support.  Tearful about starting insulin and would like time to process recommendation. Plan for second review of sugars at [facility] consult later this week.   Will try different bedtime snack options and adding evening activity.

## 2025-07-23 ENCOUNTER — CONSULT (OUTPATIENT)
Dept: MATERNAL FETAL MEDICINE | Facility: CLINIC | Age: 30
End: 2025-07-23
Payer: COMMERCIAL

## 2025-07-23 DIAGNOSIS — F41.1 GENERALIZED ANXIETY DISORDER: ICD-10-CM

## 2025-07-23 DIAGNOSIS — O99.210 OBESITY IN PREGNANCY (HHS-HCC): ICD-10-CM

## 2025-07-23 DIAGNOSIS — Z3A.31 31 WEEKS GESTATION OF PREGNANCY (HHS-HCC): ICD-10-CM

## 2025-07-23 DIAGNOSIS — O26.899 RH NEGATIVE STATE IN ANTEPARTUM PERIOD (HHS-HCC): ICD-10-CM

## 2025-07-23 DIAGNOSIS — Z67.91 RH NEGATIVE STATE IN ANTEPARTUM PERIOD (HHS-HCC): ICD-10-CM

## 2025-07-23 DIAGNOSIS — O24.419 GESTATIONAL DIABETES MELLITUS (GDM) IN THIRD TRIMESTER, GESTATIONAL DIABETES METHOD OF CONTROL UNSPECIFIED (HHS-HCC): Primary | ICD-10-CM

## 2025-07-23 PROCEDURE — 99215 OFFICE O/P EST HI 40 MIN: CPT | Mod: 95 | Performed by: NURSE PRACTITIONER

## 2025-07-23 PROCEDURE — 99205 OFFICE O/P NEW HI 60 MIN: CPT | Performed by: NURSE PRACTITIONER

## 2025-07-23 NOTE — PROGRESS NOTES
Jing Morrison is a 29 y.o. here for MFM consultation at 31w3d for GDM, referred by Dr. García    Virtual or Telephone Consent    An interactive audio and video telecommunication system which permits real time communications between the patient (at the originating site) and provider (at the distant site) was utilized to provide this telehealth service.   Verbal consent was requested and obtained from Jing Morrison on this date, 25 for a telehealth visit and the patient's location was confirmed at the time of the visit.      Overall pt reports, she is feeling well. Reports +FM. Denies VB, LOF, or CTXs.      issues:  GDM - 1hr -169, 3 hr- 97, 240, 228, 186  Obesity , Pregravid BMI 40  Rh neg- rhogam 2025    ObHx-  GynHx-Last PAP 2024, no h/o abnormal PAP  MedHx-denies  SurgHx-wisdom teeth  BtxCx-Q-C2VH, MGM/M/F-cHTN  SocHx-denies tobacco, ETOH, or illicit drug use in pregnancy  All-Sulfa   Meds-PNV, bASA, sertraline, Vit D     O:  Gen-NAD  No other exam completed, limited by virtual visit with camera    Sono-7/15: EFW 78%, repeat growth     Problem List Items Addressed This Visit          Pregnancy    31 weeks gestation of pregnancy ([facility])    Overview   -Prenatal care with Dr. García   -F/U          Gestational diabetes mellitus (GDM) in third trimester ([facility]) - Primary    Overview   -Shared Care with Dr. García  - Attended Boot Camp   - MFM Consult completed   -MFM 36 wk visit pending  -Serial growth ultrasounds starting at 28 weeks    Last ultrasound:  7/15: EFW 78%    Fetal surveillance:   -Weekly at 32 weeks  -Twice weekly at 36 weeks    Delivery Plan: pending 36 week follow up visit  Intrapartum:  GDM protocol  Postpartum: No medication    Recommend PP 2hr gtt and q3yr F/U with PCP for A1C and TSH       Current regimen:   7/15/2025: nutrition  2025: recommend NPH ins (wanted to wait until consult with other opportunity to review BG levels and try different  bedtime snacks)  25: Discussed options for alternative snacks at bedtime to control fasting levels better. Fasting was 95 this morning. Discussed concern for increased insulin resistance as pregnancy continues and the liklihood of need for treatment at some point. Will wait until next week to see if new snacks have improved fasting levels. Aware that medication would be recommended again at that time for continued bordeline or elevated numbers. Concerns about insulin. If unable to do insulin (NPH 10 qh) recommend initiate 500 metformin at bedtime            Current Assessment & Plan   Patient was recently diagnosed with gestational diabetes (GDM).  We discussed the pregnancy implications of the diagnosis including increased risk of pre-eclampsia, LGA/macrosomia, shoulder dystocia, stillbirth as well as  hypoglycemia, hyperbilirubinemia and respiratory distress.  We reviewed the importance of glycemic control and its impact on lowering these risks.  Discussed management ranging from dietary changes to pharmacotherapy and that insulin is considered first line therapy rather than oral agents if medication is ultimately needed.  Discussed our recommendation for serial growth ultrasounds and starting  testing at 32 weeks if treatment is required.  We also stressed the potential persistence of impaired glucose tolerance post pregnancy in up to 30% of patients and 50% risk of IGT/T2DM in the next 10 years with an overall lifetime risk of T2DM of 70%. We reviewed the recommendation for postpartum screening at 6 weeks post-partum (can be performed as soon as 2 days after delivery) and, if normal, routine screening every 1-3 years. We did discuss that a healthy diet and maintenance of a normal body weight may reduce those risks    In summary the following is recommended:    1. We will continue to co-manage diabetes via the Bloodsugar line with weekly assessment of glycemic control.  Current regimen is:  NPH recommended , pt wanted to wait. Has already been doing a bedtime snack.  at time of consult, recommended: can try new snacks at bedtime with more protein content. Will reassess next week.   2. We will plan for a follow up MFM visit at 36 weeks to assess overall control and provide delivery timing recommendations.  3. Recommend serial growth ultrasounds every 4 weeks starting at 28 weeks gestation.  4. Weekly  testing is recommended starting at 32 weeks IF medication is required OR there is a LGA growth pattern.  Twice weekly testing is recommended at 32 weeks if control is suboptimal, there is polyhydramnios, or medication is required AND there is a LGA growth pattern.  5. Delivery is recommended at 39 weeks, though if glycemic control is suboptimal then delivery at 37-39 weeks may be considered.  6. If the EFW is >4500g at the time of delivery  should be considered.  7. A 2hr GTT is recommended 6 weeks postpartum (can be performed as soon as 2 days postpartum), if normal then screening for T2DM is recommended at least every 3 years.             Obesity in pregnancy ([facility])    Overview   -Pre-gravid BMI 40   -Serial growth planned  -Weekly  testing 34 wks, but initiate at 32 wks for continued elevated fastings (based on blood sugars for next week- )         Rh negative state in antepartum period ([facility])    Overview   -s/p Rhogam 2025            Other    Generalized anxiety disorder    Overview   -On sertraline  -Increased anxiety with GDM dx, counseled and discussed GDM in depth - see dx              Recommend weekly communication with blood sugar line   MFM follow up at 36 wks for delivery planning   Growth q3-4 wks    testing weekly at 32 wks if fasting levels not controlled with next week's review, twice weekly at 36 wks - plan to initiate at 34 wks if fastings continue to be controlled for BMI    ALBERT Pedro-CNP  Senior [facility]  Maternal Fetal  Medicine     A total of 55 minutes was spent counseling and coordinating care for pt

## 2025-07-23 NOTE — ASSESSMENT & PLAN NOTE
Patient was recently diagnosed with gestational diabetes (GDM).  We discussed the pregnancy implications of the diagnosis including increased risk of pre-eclampsia, LGA/macrosomia, shoulder dystocia, stillbirth as well as  hypoglycemia, hyperbilirubinemia and respiratory distress.  We reviewed the importance of glycemic control and its impact on lowering these risks.  Discussed management ranging from dietary changes to pharmacotherapy and that insulin is considered first line therapy rather than oral agents if medication is ultimately needed.  Discussed our recommendation for serial growth ultrasounds and starting  testing at 32 weeks if treatment is required.  We also stressed the potential persistence of impaired glucose tolerance post pregnancy in up to 30% of patients and 50% risk of IGT/T2DM in the next 10 years with an overall lifetime risk of T2DM of 70%. We reviewed the recommendation for postpartum screening at 6 weeks post-partum (can be performed as soon as 2 days after delivery) and, if normal, routine screening every 1-3 years. We did discuss that a healthy diet and maintenance of a normal body weight may reduce those risks    In summary the following is recommended:    1. We will continue to co-manage diabetes via the Bloodsugar line with weekly assessment of glycemic control.  Current regimen is: NPH recommended , pt wanted to wait. Has already been doing a bedtime snack.  at time of consult, recommended: can try new snacks at bedtime with more protein content. Will reassess next week.   2. We will plan for a follow up [facility] visit at 36 weeks to assess overall control and provide delivery timing recommendations.  3. Recommend serial growth ultrasounds every 4 weeks starting at 28 weeks gestation.  4. Weekly  testing is recommended starting at 32 weeks IF medication is required OR there is a LGA growth pattern.  Twice weekly testing is recommended at 32 weeks if control  is suboptimal, there is polyhydramnios, or medication is required AND there is a LGA growth pattern.  5. Delivery is recommended at 39 weeks, though if glycemic control is suboptimal then delivery at 37-39 weeks may be considered.  6. If the EFW is >4500g at the time of delivery  should be considered.  7. A 2hr GTT is recommended 6 weeks postpartum (can be performed as soon as 2 days postpartum), if normal then screening for T2DM is recommended at least every 3 years.

## 2025-07-24 ENCOUNTER — APPOINTMENT (OUTPATIENT)
Dept: MATERNAL FETAL MEDICINE | Facility: CLINIC | Age: 30
End: 2025-07-24
Payer: COMMERCIAL

## 2025-07-28 ENCOUNTER — PATIENT MESSAGE (OUTPATIENT)
Dept: MATERNAL FETAL MEDICINE | Facility: CLINIC | Age: 30
End: 2025-07-28
Payer: COMMERCIAL

## 2025-07-28 DIAGNOSIS — O24.415 GESTATIONAL DIABETES MELLITUS (GDM) IN THIRD TRIMESTER CONTROLLED ON ORAL HYPOGLYCEMIC DRUG (HHS-HCC): ICD-10-CM

## 2025-07-28 RX ORDER — METFORMIN HYDROCHLORIDE 500 MG/1
TABLET, EXTENDED RELEASE ORAL
Qty: 30 TABLET | Refills: 3 | Status: SHIPPED | OUTPATIENT
Start: 2025-07-28

## 2025-08-04 ENCOUNTER — APPOINTMENT (OUTPATIENT)
Dept: BEHAVIORAL HEALTH | Facility: CLINIC | Age: 30
End: 2025-08-04
Payer: COMMERCIAL

## 2025-08-04 ENCOUNTER — TELEPHONE (OUTPATIENT)
Dept: MATERNAL FETAL MEDICINE | Facility: CLINIC | Age: 30
End: 2025-08-04

## 2025-08-04 DIAGNOSIS — F41.1 GAD (GENERALIZED ANXIETY DISORDER): ICD-10-CM

## 2025-08-04 PROCEDURE — 90837 PSYTX W PT 60 MINUTES: CPT | Performed by: PSYCHOLOGIST

## 2025-08-04 NOTE — TELEPHONE ENCOUNTER
Communicated with the patient on 8/4/2025  She has Gestational Diabetes @ 33w1d     The patient checks her sugars fasting and 1 hour after meals, and reports them as follows:        Impression:  Fasting sugars still elevated. Would like to continue with Metformin.    The patient's regimen was changed, as discussed with ALBERT Mahoney,to:   Metformin  --> 1000mg with dinner    Patient understands to submit sugar log for review weekly through the Blood Sugar Line @ [phone] or via email to [email or web address] to help optimize glucose control.

## 2025-08-04 NOTE — PROGRESS NOTES
Start time: 11:03am  End time: 11:57am      The patient was informed of the current need to conduct treatment via telephone or telehealth due to Covid-19 pandemic. Patient consented to the use of the platform that may not be HIPAA compliant. I have confirmed the patient's identity via the following (minimum of three) acceptable identifiers as per  Policy PH-9:   1. Last 4 of social  2.   3. Address    Telephone/Televideo Informed Consent for Psychotherapy was reviewed with the patient as follows:  There are potential benefits and risks of the use of telephone or video-conferencing that differ from in-person sessions. Specifically, the telephone or televideo system we are using may not be HIPAA compliant and may present limits to patient confidentiality. Confidentiality still applies for telepsychology services, and nobody will record the session without your permission. You agree to use the telephone or video-conferencing platform selected for our virtual sessions, and I will explain how to use it.  1)             You need to use a webcam or smartphone during the session.  2)             It is important that you be in a quiet, private space that is free of distractions (including cell phone or other devices) during the session.  3)             It is important to use a secure internet connection rather than public/free Wi-Fi.  4)             It is important to be on time. If you need to cancel or change your tele-appointment, you must notify the psychologist in advance by phone or email.  5)             We need a back-up plan (e.g., phone number where you can be reached) to restart the session or to reschedule it, in the event of technical problems.  6)             We need a safety plan that includes at least one emergency contact and the closest emergency room to your location, in the event of a crisis situation.  7)             If you are not an adult, we need the permission of your parent or legal guardian  "(and their contact information) for you to participate in telepsychology sessions.  Understanding and verbal agreement was attested to by the patient.    Jing has requested that her notes be blocked at this time.   Session interrupted due to technical issues but maintained session via audio until video returned.     Reason for care: Anxiety  Method of therapy: Supportive  Therapy summary: Jing reported that \"a lot has been going on.\" She found out that she has gestational diabetes and I has made things difficult. She discussed the changes she has had to make. She described feeling as though this has worsened her mood and her anxiety. She was tearful while processing these challenges. She shared that she has been feeling a lot of self-blame. We explored ways to cope with stress, uncertainty and change. She has been using deep breathing and the support of her partner. Additionally, we discussed reviewing thought reframing.    She discussed having to be assertive with others in her life and has not wanted some family members to know about her health issues. She shared that there has been a lot of conflict with family and she and her partner have been navigating this and attempting to use conflict resolution skills.     She shared that good things have been happening as well. She had her family baby shower over the weekend and found that she enjoyed herself. She reported that she received a sentimental gift from her family and processed this.     Action plan: engaging in compassionate self-talk related to thoughts of self-blame and gestational diabetes; practicing anxiety management strategies as stressors arise; practicing self-care and finding time for rest and relaxation    She denies SI/HI/AVH. We will follow-up in one month.   Identified goals/objectives: adjusting to pregnancy and coping with concerns; continue to engage in activities that bring ravi and relief from stress; Identify expectations for family " planning and parenthood; utilize anxiety management strategies.   Response to therapy: Engaged  Treatment plan and process: Continue with above stated tx goals; Process family and relationship dynamics; [place] setting; continued review of anxiety management

## 2025-08-06 ENCOUNTER — ROUTINE PRENATAL (OUTPATIENT)
Dept: OBSTETRICS AND GYNECOLOGY | Facility: CLINIC | Age: 30
End: 2025-08-06
Payer: COMMERCIAL

## 2025-08-06 VITALS — DIASTOLIC BLOOD PRESSURE: 72 MMHG | SYSTOLIC BLOOD PRESSURE: 117 MMHG | BODY MASS INDEX: 40.08 KG/M2 | WEIGHT: 212 LBS

## 2025-08-06 DIAGNOSIS — O26.899 RH NEGATIVE STATE IN ANTEPARTUM PERIOD (HHS-HCC): ICD-10-CM

## 2025-08-06 DIAGNOSIS — Z3A.33 33 WEEKS GESTATION OF PREGNANCY (HHS-HCC): ICD-10-CM

## 2025-08-06 DIAGNOSIS — O99.210 OBESITY IN PREGNANCY (HHS-HCC): ICD-10-CM

## 2025-08-06 DIAGNOSIS — Z67.91 RH NEGATIVE STATE IN ANTEPARTUM PERIOD (HHS-HCC): ICD-10-CM

## 2025-08-06 DIAGNOSIS — O24.415 GESTATIONAL DIABETES MELLITUS (GDM) IN THIRD TRIMESTER CONTROLLED ON ORAL HYPOGLYCEMIC DRUG (HHS-HCC): Primary | ICD-10-CM

## 2025-08-06 LAB
POC APPEARANCE, URINE: CLEAR
POC BILIRUBIN, URINE: NEGATIVE
POC BLOOD, URINE: NEGATIVE
POC COLOR, URINE: YELLOW
POC GLUCOSE, URINE: NEGATIVE MG/DL
POC KETONES, URINE: ABNORMAL MG/DL
POC LEUKOCYTES, URINE: NEGATIVE
POC NITRITE,URINE: NEGATIVE
POC PH, URINE: 5.5 PH
POC PROTEIN, URINE: NEGATIVE MG/DL
POC SPECIFIC GRAVITY, URINE: 1.02
POC UROBILINOGEN, URINE: 0.2 EU/DL

## 2025-08-06 PROCEDURE — 0501F PRENATAL FLOW SHEET: CPT | Performed by: OBSTETRICS & GYNECOLOGY

## 2025-08-06 PROCEDURE — 59025 FETAL NON-STRESS TEST: CPT | Performed by: OBSTETRICS & GYNECOLOGY

## 2025-08-06 PROCEDURE — 81003 URINALYSIS AUTO W/O SCOPE: CPT | Performed by: OBSTETRICS & GYNECOLOGY

## 2025-08-06 PROCEDURE — 99212 OFFICE O/P EST SF 10 MIN: CPT | Performed by: OBSTETRICS & GYNECOLOGY

## 2025-08-06 ASSESSMENT — ENCOUNTER SYMPTOMS
LOSS OF SENSATION IN FEET: 0
DEPRESSION: 0
OCCASIONAL FEELINGS OF UNSTEADINESS: 0

## 2025-08-06 ASSESSMENT — PATIENT HEALTH QUESTIONNAIRE - PHQ9
2. FEELING DOWN, DEPRESSED OR HOPELESS: NOT AT ALL
1. LITTLE INTEREST OR PLEASURE IN DOING THINGS: NOT AT ALL
SUM OF ALL RESPONSES TO PHQ9 QUESTIONS 1 & 2: 0

## 2025-08-06 ASSESSMENT — PAIN - FUNCTIONAL ASSESSMENT: PAIN_FUNCTIONAL_ASSESSMENT: 0-10

## 2025-08-06 ASSESSMENT — LIFESTYLE VARIABLES
HOW OFTEN DO YOU HAVE A DRINK CONTAINING ALCOHOL: NEVER
HOW MANY STANDARD DRINKS CONTAINING ALCOHOL DO YOU HAVE ON A TYPICAL DAY: PATIENT DOES NOT DRINK
HOW OFTEN DO YOU HAVE SIX OR MORE DRINKS ON ONE OCCASION: NEVER
SKIP TO QUESTIONS 9-10: 1
AUDIT-C TOTAL SCORE: 0

## 2025-08-06 ASSESSMENT — PAIN SCALES - GENERAL: PAINLEVEL_OUTOF10: 0 - NO PAIN

## 2025-08-06 NOTE — PROGRESS NOTES
Subjective   Patient ID [id number]   Jing Morrison is a 29 y.o.  at 33w3d with a working estimated date of delivery of 2025, by Last Menstrual Period who presents for a routine prenatal visit. She denies vaginal bleeding, leakage of fluid, decreased fetal movements, or contractions.    Her pregnancy is complicated by:  Rh neg, rhogam at 28wks, 25  Initial BMI >40, NSTs at 34wks  Increased risk of preE, start asa at 12-16wks  Tdap 25  GDMA, on metformin at bedtime, increased to 1000mg w/dinner, will start NSTs    Objective   Physical Exam  Weight: 96.2 kg (212 lb)  Pregravid BMI: 40.46  BP: 117/72  Fetal Heart Rate: reactive NST               Prenatal Labs  Urine dip:  Results for orders placed or performed in visit on 25   POCT UA Automated manually resulted    Collection Time: 25 11:43 AM   Result Value Ref Range    POC Color, Urine Yellow Straw, Yellow, Light-Yellow    POC Appearance, Urine Clear Clear    POC Glucose, Urine NEGATIVE NEGATIVE mg/dl    POC Bilirubin, Urine NEGATIVE NEGATIVE    POC Ketones, Urine 15 (1+) (A) NEGATIVE mg/dl    POC Specific Gravity, Urine 1.020 1.005 - 1.035    POC Blood, Urine NEGATIVE NEGATIVE    POC PH, Urine 5.5 No Reference Range Established PH    POC Protein, Urine NEGATIVE NEGATIVE mg/dl    POC Urobilinogen, Urine 0.2 0.2, 1.0 EU/DL    Poc Nitrite, Urine NEGATIVE NEGATIVE    POC Leukocytes, Urine NEGATIVE NEGATIVE     Imaging revd    Assessment/Plan   Problem List Items Addressed This Visit           ICD-10-CM    Gestational diabetes mellitus (GDM) in third trimester ([facility]) - Primary O24.419    Relevant Orders    POCT UA Automated manually resulted (Completed)    Fetal nonstress test (Completed)    Obesity in pregnancy ([facility]) O99.210    Rh negative state in antepartum period ([facility]) O26.899, Z67.91    31 weeks gestation of pregnancy ([facility]) Z3A.31     Continue prenatal vitamin.  Labs reviewed.  Movement counts   labor  precautions    Follow up in 2 weeks for a routine prenatal visit.

## 2025-08-06 NOTE — PROCEDURES
Jing Morrison, a  at 33w3d with an RAVI of 2025, by Last Menstrual Period, was seen at [facility] for a nonstress test.    Non-Stress Test   Baseline Fetal Heart Rate for Non-Stress Test: 135 BPM  Variability in Waveform for Non-Stress Test: Moderate  Accelerations in Non-Stress Test: Yes, greater than/equal to 15 bpm, lasting at least 15 seconds  Decelerations in Non-Stress Test: None  Contractions in Non-Stress Test: Not present  Acoustic Stimulator for Non-Stress Test: No  Interpretation of Non-Stress Test   Interpretation of Non-Stress Test: Reactive

## 2025-08-11 ENCOUNTER — PATIENT MESSAGE (OUTPATIENT)
Dept: MATERNAL FETAL MEDICINE | Facility: CLINIC | Age: 30
End: 2025-08-11
Payer: COMMERCIAL

## 2025-08-11 DIAGNOSIS — O24.415 GESTATIONAL DIABETES MELLITUS (GDM) IN THIRD TRIMESTER CONTROLLED ON ORAL HYPOGLYCEMIC DRUG (HHS-HCC): ICD-10-CM

## 2025-08-11 RX ORDER — METFORMIN HYDROCHLORIDE 500 MG/1
TABLET, EXTENDED RELEASE ORAL
Qty: 60 TABLET | Refills: 3 | Status: SHIPPED | OUTPATIENT
Start: 2025-08-11

## 2025-08-12 ENCOUNTER — HOSPITAL ENCOUNTER (OUTPATIENT)
Dept: RADIOLOGY | Facility: CLINIC | Age: 30
Discharge: HOME | End: 2025-08-12
Payer: COMMERCIAL

## 2025-08-12 DIAGNOSIS — O24.410 DIET CONTROLLED GESTATIONAL DIABETES MELLITUS (GDM), ANTEPARTUM (HHS-HCC): ICD-10-CM

## 2025-08-12 PROCEDURE — 76819 FETAL BIOPHYS PROFIL W/O NST: CPT

## 2025-08-12 PROCEDURE — 76816 OB US FOLLOW-UP PER FETUS: CPT

## 2025-08-12 PROCEDURE — 76816 OB US FOLLOW-UP PER FETUS: CPT | Performed by: OBSTETRICS & GYNECOLOGY

## 2025-08-12 PROCEDURE — 76819 FETAL BIOPHYS PROFIL W/O NST: CPT | Performed by: OBSTETRICS & GYNECOLOGY

## 2025-08-18 ENCOUNTER — PATIENT MESSAGE (OUTPATIENT)
Dept: MATERNAL FETAL MEDICINE | Facility: CLINIC | Age: 30
End: 2025-08-18
Payer: COMMERCIAL

## 2025-08-20 ENCOUNTER — ROUTINE PRENATAL (OUTPATIENT)
Dept: OBSTETRICS AND GYNECOLOGY | Facility: CLINIC | Age: 30
End: 2025-08-20
Payer: COMMERCIAL

## 2025-08-20 VITALS — WEIGHT: 215 LBS | SYSTOLIC BLOOD PRESSURE: 121 MMHG | DIASTOLIC BLOOD PRESSURE: 85 MMHG | BODY MASS INDEX: 40.65 KG/M2

## 2025-08-20 DIAGNOSIS — O99.210 OBESITY IN PREGNANCY (HHS-HCC): ICD-10-CM

## 2025-08-20 DIAGNOSIS — O24.415 GESTATIONAL DIABETES MELLITUS (GDM) IN THIRD TRIMESTER CONTROLLED ON ORAL HYPOGLYCEMIC DRUG (HHS-HCC): Primary | ICD-10-CM

## 2025-08-20 DIAGNOSIS — Z67.91 RH NEGATIVE STATE IN ANTEPARTUM PERIOD (HHS-HCC): ICD-10-CM

## 2025-08-20 DIAGNOSIS — Z3A.35 35 WEEKS GESTATION OF PREGNANCY (HHS-HCC): ICD-10-CM

## 2025-08-20 DIAGNOSIS — O26.899 RH NEGATIVE STATE IN ANTEPARTUM PERIOD (HHS-HCC): ICD-10-CM

## 2025-08-20 PROCEDURE — 99213 OFFICE O/P EST LOW 20 MIN: CPT | Mod: 25 | Performed by: OBSTETRICS & GYNECOLOGY

## 2025-08-20 PROCEDURE — 59025 FETAL NON-STRESS TEST: CPT | Performed by: OBSTETRICS & GYNECOLOGY

## 2025-08-20 PROCEDURE — 0501F PRENATAL FLOW SHEET: CPT | Performed by: OBSTETRICS & GYNECOLOGY

## 2025-08-20 ASSESSMENT — ENCOUNTER SYMPTOMS
OCCASIONAL FEELINGS OF UNSTEADINESS: 0
LOSS OF SENSATION IN FEET: 0
DEPRESSION: 0

## 2025-08-20 ASSESSMENT — LIFESTYLE VARIABLES: HOW MANY STANDARD DRINKS CONTAINING ALCOHOL DO YOU HAVE ON A TYPICAL DAY: PATIENT DOES NOT DRINK

## 2025-08-20 ASSESSMENT — PAIN SCALES - GENERAL: PAINLEVEL_OUTOF10: 0 - NO PAIN

## 2025-08-20 ASSESSMENT — PATIENT HEALTH QUESTIONNAIRE - PHQ9
SUM OF ALL RESPONSES TO PHQ9 QUESTIONS 1 & 2: 0
1. LITTLE INTEREST OR PLEASURE IN DOING THINGS: NOT AT ALL
2. FEELING DOWN, DEPRESSED OR HOPELESS: NOT AT ALL

## 2025-08-20 ASSESSMENT — PAIN - FUNCTIONAL ASSESSMENT: PAIN_FUNCTIONAL_ASSESSMENT: 0-10

## 2025-08-23 ENCOUNTER — HOSPITAL ENCOUNTER (OUTPATIENT)
Facility: HOSPITAL | Age: 30
Discharge: HOME | End: 2025-08-23
Attending: OBSTETRICS & GYNECOLOGY | Admitting: OBSTETRICS & GYNECOLOGY
Payer: COMMERCIAL

## 2025-08-25 ENCOUNTER — DOCUMENTATION (OUTPATIENT)
Dept: MATERNAL FETAL MEDICINE | Facility: CLINIC | Age: 30
End: 2025-08-25
Payer: COMMERCIAL

## 2025-08-26 ENCOUNTER — APPOINTMENT (OUTPATIENT)
Dept: OBSTETRICS AND GYNECOLOGY | Facility: CLINIC | Age: 30
End: 2025-08-26
Payer: COMMERCIAL

## 2025-08-26 ENCOUNTER — ROUTINE PRENATAL (OUTPATIENT)
Dept: MATERNAL FETAL MEDICINE | Facility: CLINIC | Age: 30
End: 2025-08-26
Payer: COMMERCIAL

## 2025-08-26 ENCOUNTER — APPOINTMENT (OUTPATIENT)
Dept: MATERNAL FETAL MEDICINE | Facility: CLINIC | Age: 30
End: 2025-08-26
Payer: COMMERCIAL

## 2025-08-26 VITALS — SYSTOLIC BLOOD PRESSURE: 128 MMHG | BODY MASS INDEX: 40.27 KG/M2 | DIASTOLIC BLOOD PRESSURE: 84 MMHG | WEIGHT: 213 LBS

## 2025-08-26 DIAGNOSIS — F41.1 GENERALIZED ANXIETY DISORDER: ICD-10-CM

## 2025-08-26 DIAGNOSIS — O26.899 RH NEGATIVE STATE IN ANTEPARTUM PERIOD (HHS-HCC): ICD-10-CM

## 2025-08-26 DIAGNOSIS — O24.419 GESTATIONAL DIABETES MELLITUS (GDM) IN THIRD TRIMESTER, GESTATIONAL DIABETES METHOD OF CONTROL UNSPECIFIED (HHS-HCC): Primary | ICD-10-CM

## 2025-08-26 DIAGNOSIS — Z67.91 RH NEGATIVE STATE IN ANTEPARTUM PERIOD (HHS-HCC): ICD-10-CM

## 2025-08-26 DIAGNOSIS — Z3A.36 36 WEEKS GESTATION OF PREGNANCY (HHS-HCC): ICD-10-CM

## 2025-08-26 PROCEDURE — 99212 OFFICE O/P EST SF 10 MIN: CPT | Mod: 25 | Performed by: STUDENT IN AN ORGANIZED HEALTH CARE EDUCATION/TRAINING PROGRAM

## 2025-08-26 PROCEDURE — 59025 FETAL NON-STRESS TEST: CPT | Performed by: STUDENT IN AN ORGANIZED HEALTH CARE EDUCATION/TRAINING PROGRAM

## 2025-08-26 PROCEDURE — 99213 OFFICE O/P EST LOW 20 MIN: CPT | Performed by: STUDENT IN AN ORGANIZED HEALTH CARE EDUCATION/TRAINING PROGRAM

## 2025-08-26 RX ORDER — ASPIRIN 81 MG/1
162 TABLET ORAL DAILY
COMMUNITY

## 2025-08-28 ENCOUNTER — TELEPHONE (OUTPATIENT)
Dept: OBSTETRICS AND GYNECOLOGY | Facility: CLINIC | Age: 30
End: 2025-08-28

## 2025-08-28 ENCOUNTER — ROUTINE PRENATAL (OUTPATIENT)
Dept: OBSTETRICS AND GYNECOLOGY | Facility: CLINIC | Age: 30
End: 2025-08-28
Payer: COMMERCIAL

## 2025-08-28 ASSESSMENT — ENCOUNTER SYMPTOMS
DEPRESSION: 0
LOSS OF SENSATION IN FEET: 0
OCCASIONAL FEELINGS OF UNSTEADINESS: 0

## 2025-08-28 ASSESSMENT — PAIN SCALES - GENERAL: PAINLEVEL_OUTOF10: 0 - NO PAIN

## 2025-08-28 ASSESSMENT — LIFESTYLE VARIABLES: HOW MANY STANDARD DRINKS CONTAINING ALCOHOL DO YOU HAVE ON A TYPICAL DAY: PATIENT DOES NOT DRINK

## 2025-08-28 ASSESSMENT — PAIN - FUNCTIONAL ASSESSMENT: PAIN_FUNCTIONAL_ASSESSMENT: 0-10

## 2025-09-02 ENCOUNTER — ANESTHESIA (OUTPATIENT)
Dept: OBSTETRICS AND GYNECOLOGY | Facility: HOSPITAL | Age: 30
End: 2025-09-02
Payer: COMMERCIAL

## 2025-09-02 ENCOUNTER — ANESTHESIA EVENT (OUTPATIENT)
Dept: OBSTETRICS AND GYNECOLOGY | Facility: HOSPITAL | Age: 30
End: 2025-09-02
Payer: COMMERCIAL

## 2025-09-02 ENCOUNTER — HOSPITAL ENCOUNTER (INPATIENT)
Facility: HOSPITAL | Age: 30
LOS: 2 days | Discharge: HOME | End: 2025-09-04
Attending: OBSTETRICS & GYNECOLOGY | Admitting: OBSTETRICS & GYNECOLOGY
Payer: COMMERCIAL

## 2025-09-02 ENCOUNTER — APPOINTMENT (OUTPATIENT)
Dept: OBSTETRICS AND GYNECOLOGY | Facility: CLINIC | Age: 30
End: 2025-09-02
Payer: COMMERCIAL

## 2025-09-02 ENCOUNTER — APPOINTMENT (OUTPATIENT)
Dept: OBSTETRICS AND GYNECOLOGY | Facility: HOSPITAL | Age: 30
End: 2025-09-02
Payer: COMMERCIAL

## 2025-09-02 DIAGNOSIS — R52 POSTPARTUM PAIN (HHS-HCC): Primary | ICD-10-CM

## 2025-09-02 PROBLEM — Z3A.37 37 WEEKS GESTATION OF PREGNANCY (HHS-HCC): Status: ACTIVE | Noted: 2025-07-23

## 2025-09-02 PROBLEM — R01.1 MURMUR: Status: ACTIVE | Noted: 2025-09-02

## 2025-09-02 PROBLEM — O09.93 SUPERVISION OF HIGH-RISK PREGNANCY, THIRD TRIMESTER (HHS-HCC): Status: ACTIVE | Noted: 2025-09-02

## 2025-09-02 LAB
ABO GROUP (TYPE) IN BLOOD: NORMAL
ABO GROUP (TYPE) IN BLOOD: NORMAL
ANTIBODY SCREEN: NORMAL
ERYTHROCYTE [DISTWIDTH] IN BLOOD BY AUTOMATED COUNT: 14.9 % (ref 11.5–14.5)
GLUCOSE BLD MANUAL STRIP-MCNC: 134 MG/DL (ref 74–99)
GLUCOSE BLD MANUAL STRIP-MCNC: 73 MG/DL (ref 74–99)
GLUCOSE BLD MANUAL STRIP-MCNC: 79 MG/DL (ref 74–99)
GLUCOSE BLD MANUAL STRIP-MCNC: 98 MG/DL (ref 74–99)
HCT VFR BLD AUTO: 37 % (ref 36–46)
HGB BLD-MCNC: 11.9 G/DL (ref 12–16)
MCH RBC QN AUTO: 26.1 PG (ref 26–34)
MCHC RBC AUTO-ENTMCNC: 32.2 G/DL (ref 32–36)
MCV RBC AUTO: 81 FL (ref 80–100)
NRBC BLD-RTO: 0 /100 WBCS (ref 0–0)
PLATELET # BLD AUTO: 219 X10*3/UL (ref 150–450)
RBC # BLD AUTO: 4.56 X10*6/UL (ref 4–5.2)
RH FACTOR (ANTIGEN D): NORMAL
RH FACTOR (ANTIGEN D): NORMAL
TREPONEMA PALLIDUM IGG+IGM AB [PRESENCE] IN SERUM OR PLASMA BY IMMUNOASSAY: NONREACTIVE
WBC # BLD AUTO: 7.7 X10*3/UL (ref 4.4–11.3)

## 2025-09-02 PROCEDURE — 85027 COMPLETE CBC AUTOMATED: CPT | Performed by: OBSTETRICS & GYNECOLOGY

## 2025-09-02 PROCEDURE — 2500000004 HC RX 250 GENERAL PHARMACY W/ HCPCS (ALT 636 FOR OP/ED): Performed by: NURSE ANESTHETIST, CERTIFIED REGISTERED

## 2025-09-02 PROCEDURE — 7210000002 HC LABOR PER HOUR

## 2025-09-02 PROCEDURE — 36415 COLL VENOUS BLD VENIPUNCTURE: CPT | Performed by: OBSTETRICS & GYNECOLOGY

## 2025-09-02 PROCEDURE — 86780 TREPONEMA PALLIDUM: CPT | Mod: TRILAB | Performed by: OBSTETRICS & GYNECOLOGY

## 2025-09-02 PROCEDURE — 2500000004 HC RX 250 GENERAL PHARMACY W/ HCPCS (ALT 636 FOR OP/ED): Performed by: OBSTETRICS & GYNECOLOGY

## 2025-09-02 PROCEDURE — 59050 FETAL MONITOR W/REPORT: CPT

## 2025-09-02 PROCEDURE — 3700000014 EPIDURAL BLOCK: Performed by: NURSE ANESTHETIST, CERTIFIED REGISTERED

## 2025-09-02 PROCEDURE — 01967 NEURAXL LBR ANES VAG DLVR: CPT | Performed by: NURSE ANESTHETIST, CERTIFIED REGISTERED

## 2025-09-02 PROCEDURE — 1220000001 HC OB SEMI-PRIVATE ROOM DAILY

## 2025-09-02 PROCEDURE — 86901 BLOOD TYPING SEROLOGIC RH(D): CPT | Performed by: OBSTETRICS & GYNECOLOGY

## 2025-09-02 PROCEDURE — 82947 ASSAY GLUCOSE BLOOD QUANT: CPT

## 2025-09-02 RX ORDER — FENTANYL/ROPIVACAINE/NS/PF 2MCG/ML-.2
0-25 PLASTIC BAG, INJECTION (ML) INJECTION CONTINUOUS
Status: DISCONTINUED | OUTPATIENT
Start: 2025-09-02 | End: 2025-09-03

## 2025-09-02 RX ORDER — DIPHENHYDRAMINE HYDROCHLORIDE 50 MG/ML
25 INJECTION, SOLUTION INTRAMUSCULAR; INTRAVENOUS ONCE
Status: COMPLETED | OUTPATIENT
Start: 2025-09-02 | End: 2025-09-02

## 2025-09-02 RX ORDER — LIDOCAINE HYDROCHLORIDE 10 MG/ML
20 INJECTION, SOLUTION INFILTRATION; PERINEURAL ONCE AS NEEDED
Status: DISCONTINUED | OUTPATIENT
Start: 2025-09-02 | End: 2025-09-03

## 2025-09-02 RX ORDER — OXYTOCIN/0.9 % SODIUM CHLORIDE 30/500 ML
60 PLASTIC BAG, INJECTION (ML) INTRAVENOUS ONCE AS NEEDED
Status: DISCONTINUED | OUTPATIENT
Start: 2025-09-02 | End: 2025-09-03

## 2025-09-02 RX ORDER — OXYTOCIN 10 [USP'U]/ML
10 INJECTION, SOLUTION INTRAMUSCULAR; INTRAVENOUS ONCE AS NEEDED
Status: DISCONTINUED | OUTPATIENT
Start: 2025-09-02 | End: 2025-09-03

## 2025-09-02 RX ORDER — TRANEXAMIC ACID 10 MG/ML
1000 INJECTION, SOLUTION INTRAVENOUS ONCE AS NEEDED
Status: DISCONTINUED | OUTPATIENT
Start: 2025-09-02 | End: 2025-09-03

## 2025-09-02 RX ORDER — FENTANYL/ROPIVACAINE/NS/PF 2MCG/ML-.2
0-25 PLASTIC BAG, INJECTION (ML) INJECTION CONTINUOUS
Refills: 0 | Status: CANCELLED | OUTPATIENT
Start: 2025-09-02

## 2025-09-02 RX ORDER — LIDOCAINE HYDROCHLORIDE AND EPINEPHRINE 15; 5 MG/ML; UG/ML
INJECTION, SOLUTION EPIDURAL AS NEEDED
Status: DISCONTINUED | OUTPATIENT
Start: 2025-09-02 | End: 2025-09-03

## 2025-09-02 RX ORDER — ONDANSETRON HYDROCHLORIDE 2 MG/ML
4 INJECTION, SOLUTION INTRAVENOUS EVERY 6 HOURS PRN
Status: DISCONTINUED | OUTPATIENT
Start: 2025-09-02 | End: 2025-09-03

## 2025-09-02 RX ORDER — ONDANSETRON 4 MG/1
4 TABLET, FILM COATED ORAL EVERY 6 HOURS PRN
Status: DISCONTINUED | OUTPATIENT
Start: 2025-09-02 | End: 2025-09-03

## 2025-09-02 RX ORDER — CALCIUM CARBONATE 200(500)MG
1 TABLET,CHEWABLE ORAL EVERY 6 HOURS PRN
Status: DISCONTINUED | OUTPATIENT
Start: 2025-09-02 | End: 2025-09-03

## 2025-09-02 RX ORDER — CARBOPROST TROMETHAMINE 250 UG/ML
250 INJECTION, SOLUTION INTRAMUSCULAR ONCE AS NEEDED
Status: DISCONTINUED | OUTPATIENT
Start: 2025-09-02 | End: 2025-09-03

## 2025-09-02 RX ORDER — SODIUM CHLORIDE, SODIUM LACTATE, POTASSIUM CHLORIDE, CALCIUM CHLORIDE 600; 310; 30; 20 MG/100ML; MG/100ML; MG/100ML; MG/100ML
75 INJECTION, SOLUTION INTRAVENOUS CONTINUOUS
Status: DISCONTINUED | OUTPATIENT
Start: 2025-09-02 | End: 2025-09-03

## 2025-09-02 RX ORDER — OXYTOCIN/0.9 % SODIUM CHLORIDE 30/500 ML
2-30 PLASTIC BAG, INJECTION (ML) INTRAVENOUS CONTINUOUS
Status: DISCONTINUED | OUTPATIENT
Start: 2025-09-02 | End: 2025-09-03

## 2025-09-02 RX ORDER — TERBUTALINE SULFATE 1 MG/ML
0.25 INJECTION SUBCUTANEOUS ONCE AS NEEDED
Status: DISCONTINUED | OUTPATIENT
Start: 2025-09-02 | End: 2025-09-03

## 2025-09-02 RX ORDER — MISOPROSTOL 200 UG/1
800 TABLET ORAL ONCE AS NEEDED
Status: DISCONTINUED | OUTPATIENT
Start: 2025-09-02 | End: 2025-09-03

## 2025-09-02 RX ORDER — LABETALOL HYDROCHLORIDE 5 MG/ML
20 INJECTION, SOLUTION INTRAVENOUS ONCE AS NEEDED
Status: DISCONTINUED | OUTPATIENT
Start: 2025-09-02 | End: 2025-09-03

## 2025-09-02 RX ORDER — LOPERAMIDE HYDROCHLORIDE 2 MG/1
4 CAPSULE ORAL EVERY 2 HOUR PRN
Status: DISCONTINUED | OUTPATIENT
Start: 2025-09-02 | End: 2025-09-03

## 2025-09-02 RX ORDER — METHYLERGONOVINE MALEATE 0.2 MG/ML
0.2 INJECTION INTRAVENOUS ONCE AS NEEDED
Status: DISCONTINUED | OUTPATIENT
Start: 2025-09-02 | End: 2025-09-03

## 2025-09-02 RX ORDER — SERTRALINE HYDROCHLORIDE 50 MG/1
50 TABLET, FILM COATED ORAL DAILY
Status: DISCONTINUED | OUTPATIENT
Start: 2025-09-03 | End: 2025-09-04 | Stop reason: HOSPADM

## 2025-09-02 RX ORDER — HYDRALAZINE HYDROCHLORIDE 20 MG/ML
5 INJECTION INTRAMUSCULAR; INTRAVENOUS ONCE AS NEEDED
Status: DISCONTINUED | OUTPATIENT
Start: 2025-09-02 | End: 2025-09-03

## 2025-09-02 RX ADMIN — SODIUM CHLORIDE, SODIUM LACTATE, POTASSIUM CHLORIDE, AND CALCIUM CHLORIDE 75 ML/HR: 600; 310; 30; 20 INJECTION, SOLUTION INTRAVENOUS at 08:47

## 2025-09-02 RX ADMIN — DIPHENHYDRAMINE HYDROCHLORIDE 25 MG: 50 INJECTION, SOLUTION INTRAMUSCULAR; INTRAVENOUS at 20:39

## 2025-09-02 RX ADMIN — Medication 10 ML/HR: at 20:38

## 2025-09-02 RX ADMIN — SODIUM CHLORIDE, SODIUM LACTATE, POTASSIUM CHLORIDE, AND CALCIUM CHLORIDE 75 ML/HR: 600; 310; 30; 20 INJECTION, SOLUTION INTRAVENOUS at 15:12

## 2025-09-02 RX ADMIN — LIDOCAINE HYDROCHLORIDE AND EPINEPHRINE 3 ML: 15; 5 INJECTION, SOLUTION EPIDURAL at 13:49

## 2025-09-02 RX ADMIN — Medication 2 MILLI-UNITS/MIN: at 08:51

## 2025-09-02 RX ADMIN — Medication 10 ML/HR: at 13:53

## 2025-09-02 RX ADMIN — SODIUM CHLORIDE, SODIUM LACTATE, POTASSIUM CHLORIDE, AND CALCIUM CHLORIDE 500 ML: .6; .31; .03; .02 INJECTION, SOLUTION INTRAVENOUS at 13:22

## 2025-09-02 RX ADMIN — LIDOCAINE HYDROCHLORIDE AND EPINEPHRINE 2 ML: 15; 5 INJECTION, SOLUTION EPIDURAL at 13:51

## 2025-09-02 SDOH — SOCIAL STABILITY: SOCIAL NETWORK: HOW OFTEN DO YOU GET TOGETHER WITH FRIENDS OR RELATIVES?: THREE TIMES A WEEK

## 2025-09-02 SDOH — SOCIAL STABILITY: SOCIAL INSECURITY: ARE YOU OR HAVE YOU BEEN THREATENED OR ABUSED PHYSICALLY, EMOTIONALLY, OR SEXUALLY BY ANYONE?: NO

## 2025-09-02 SDOH — SOCIAL STABILITY: SOCIAL INSECURITY
WITHIN THE LAST YEAR, HAVE YOU BEEN RAPED OR FORCED TO HAVE ANY KIND OF SEXUAL ACTIVITY BY YOUR PARTNER OR EX-PARTNER?: NO

## 2025-09-02 SDOH — HEALTH STABILITY: MENTAL HEALTH: NON-SPECIFIC ACTIVE SUICIDAL THOUGHTS (PAST 1 MONTH): NO

## 2025-09-02 SDOH — SOCIAL STABILITY: SOCIAL INSECURITY: WITHIN THE LAST YEAR, HAVE YOU BEEN AFRAID OF YOUR PARTNER OR EX-PARTNER?: NO

## 2025-09-02 SDOH — SOCIAL STABILITY: SOCIAL INSECURITY: ARE YOU MARRIED, WIDOWED, DIVORCED, SEPARATED, NEVER MARRIED, OR LIVING WITH A PARTNER?: MARRIED

## 2025-09-02 SDOH — SOCIAL STABILITY: SOCIAL INSECURITY
WITHIN THE LAST YEAR, HAVE YOU BEEN KICKED, HIT, SLAPPED, OR OTHERWISE PHYSICALLY HURT BY YOUR PARTNER OR EX-PARTNER?: NO

## 2025-09-02 SDOH — SOCIAL STABILITY: SOCIAL INSECURITY: ABUSE SCREEN: ADULT

## 2025-09-02 SDOH — ECONOMIC STABILITY: FOOD INSECURITY: WITHIN THE PAST 12 MONTHS, YOU WORRIED THAT YOUR FOOD WOULD RUN OUT BEFORE YOU GOT THE MONEY TO BUY MORE.: NEVER TRUE

## 2025-09-02 SDOH — SOCIAL STABILITY: SOCIAL NETWORK: HOW OFTEN DO YOU ATTEND CHURCH OR RELIGIOUS SERVICES?: NEVER

## 2025-09-02 SDOH — SOCIAL STABILITY: SOCIAL NETWORK: IN A TYPICAL WEEK, HOW MANY TIMES DO YOU TALK ON THE PHONE WITH FAMILY, FRIENDS, OR NEIGHBORS?: THREE TIMES A WEEK

## 2025-09-02 SDOH — SOCIAL STABILITY: SOCIAL INSECURITY: WITHIN THE LAST YEAR, HAVE YOU BEEN HUMILIATED OR EMOTIONALLY ABUSED IN OTHER WAYS BY YOUR PARTNER OR EX-PARTNER?: NO

## 2025-09-02 SDOH — HEALTH STABILITY: MENTAL HEALTH: CURRENT SMOKER: 0

## 2025-09-02 SDOH — HEALTH STABILITY: MENTAL HEALTH: HAVE YOU USED ANY SUBSTANCES (CANABIS, COCAINE, HEROIN, HALLUCINOGENS, INHALANTS, ETC.) IN THE PAST 12 MONTHS?: NO

## 2025-09-02 SDOH — ECONOMIC STABILITY: HOUSING INSECURITY: DO YOU FEEL UNSAFE GOING BACK TO THE PLACE WHERE YOU ARE LIVING?: NO

## 2025-09-02 SDOH — SOCIAL STABILITY: SOCIAL NETWORK: HOW OFTEN DO YOU ATTEND MEETINGS OF THE CLUBS OR ORGANIZATIONS YOU BELONG TO?: NEVER

## 2025-09-02 SDOH — SOCIAL STABILITY: SOCIAL INSECURITY: VERBAL ABUSE: DENIES

## 2025-09-02 SDOH — SOCIAL STABILITY: SOCIAL NETWORK
DO YOU BELONG TO ANY CLUBS OR ORGANIZATIONS SUCH AS CHURCH GROUPS, UNIONS, FRATERNAL OR ATHLETIC GROUPS, OR SCHOOL GROUPS?: NO

## 2025-09-02 SDOH — ECONOMIC STABILITY: FOOD INSECURITY: WITHIN THE PAST 12 MONTHS, THE FOOD YOU BOUGHT JUST DIDN'T LAST AND YOU DIDN'T HAVE MONEY TO GET MORE.: NEVER TRUE

## 2025-09-02 SDOH — HEALTH STABILITY: PHYSICAL HEALTH
HOW OFTEN DO YOU NEED TO HAVE SOMEONE HELP YOU WHEN YOU READ INSTRUCTIONS, PAMPHLETS, OR OTHER WRITTEN MATERIAL FROM YOUR DOCTOR OR PHARMACY?: NEVER

## 2025-09-02 SDOH — HEALTH STABILITY: MENTAL HEALTH: WISH TO BE DEAD (PAST 1 MONTH): NO

## 2025-09-02 SDOH — HEALTH STABILITY: MENTAL HEALTH
DO YOU FEEL STRESS - TENSE, RESTLESS, NERVOUS, OR ANXIOUS, OR UNABLE TO SLEEP AT NIGHT BECAUSE YOUR MIND IS TROUBLED ALL THE TIME - THESE DAYS?: NOT AT ALL

## 2025-09-02 SDOH — SOCIAL STABILITY: SOCIAL INSECURITY: HAVE YOU HAD THOUGHTS OF HARMING ANYONE ELSE?: NO

## 2025-09-02 SDOH — HEALTH STABILITY: MENTAL HEALTH: SUICIDAL BEHAVIOR (LIFETIME): NO

## 2025-09-02 SDOH — HEALTH STABILITY: MENTAL HEALTH: HAVE YOU USED ANY PRESCRIPTION DRUGS OTHER THAN PRESCRIBED IN THE PAST 12 MONTHS?: NO

## 2025-09-02 SDOH — SOCIAL STABILITY: SOCIAL INSECURITY: DO YOU FEEL ANYONE HAS EXPLOITED OR TAKEN ADVANTAGE OF YOU FINANCIALLY OR OF YOUR PERSONAL PROPERTY?: NO

## 2025-09-02 SDOH — SOCIAL STABILITY: SOCIAL INSECURITY: ARE THERE ANY APPARENT SIGNS OF INJURIES/BEHAVIORS THAT COULD BE RELATED TO ABUSE/NEGLECT?: NO

## 2025-09-02 SDOH — HEALTH STABILITY: MENTAL HEALTH: WERE YOU ABLE TO COMPLETE ALL THE BEHAVIORAL HEALTH SCREENINGS?: YES

## 2025-09-02 SDOH — SOCIAL STABILITY: SOCIAL INSECURITY: HAS ANYONE EVER THREATENED TO HURT YOUR FAMILY OR YOUR PETS?: NO

## 2025-09-02 SDOH — HEALTH STABILITY: MENTAL HEALTH: STRENGTHS (MUST CHOOSE TWO): SUPPORT FROM FAMILY;SUPPORT FROM FRIENDS;EDUCATION;SENSE OF HUMOR

## 2025-09-02 SDOH — SOCIAL STABILITY: SOCIAL INSECURITY: HAVE YOU HAD ANY THOUGHTS OF HARMING ANYONE ELSE?: NO

## 2025-09-02 SDOH — SOCIAL STABILITY: SOCIAL INSECURITY: DOES ANYONE TRY TO KEEP YOU FROM HAVING/CONTACTING OTHER FRIENDS OR DOING THINGS OUTSIDE YOUR HOME?: NO

## 2025-09-02 SDOH — SOCIAL STABILITY: SOCIAL INSECURITY: PHYSICAL ABUSE: DENIES

## 2025-09-02 ASSESSMENT — LIFESTYLE VARIABLES
SKIP TO QUESTIONS 9-10: 1
HOW OFTEN DO YOU HAVE 6 OR MORE DRINKS ON ONE OCCASION: NEVER
HOW MANY STANDARD DRINKS CONTAINING ALCOHOL DO YOU HAVE ON A TYPICAL DAY: PATIENT DOES NOT DRINK
AUDIT-C TOTAL SCORE: 0
AUDIT-C TOTAL SCORE: 0
HOW OFTEN DO YOU HAVE A DRINK CONTAINING ALCOHOL: NEVER

## 2025-09-02 ASSESSMENT — PAIN SCALES - GENERAL
PAINLEVEL_OUTOF10: 0 - NO PAIN
PAINLEVEL_OUTOF10: 6
PAINLEVEL_OUTOF10: 0 - NO PAIN

## 2025-09-02 ASSESSMENT — ACTIVITIES OF DAILY LIVING (ADL)
BATHING: INDEPENDENT
LACK_OF_TRANSPORTATION: NO
GROOMING: INDEPENDENT
JUDGMENT_ADEQUATE_SAFELY_COMPLETE_DAILY_ACTIVITIES: YES
ADEQUATE_TO_COMPLETE_ADL: YES
HEARING - RIGHT EAR: FUNCTIONAL
WALKS IN HOME: INDEPENDENT
PATIENT'S MEMORY ADEQUATE TO SAFELY COMPLETE DAILY ACTIVITIES?: YES
HEARING - LEFT EAR: FUNCTIONAL
DRESSING YOURSELF: INDEPENDENT
TOILETING: INDEPENDENT
ASSISTIVE_DEVICE: EYEGLASSES
FEEDING YOURSELF: INDEPENDENT

## 2025-09-03 ENCOUNTER — APPOINTMENT (OUTPATIENT)
Dept: OBSTETRICS AND GYNECOLOGY | Facility: CLINIC | Age: 30
End: 2025-09-03
Payer: COMMERCIAL

## 2025-09-03 LAB
FETAL MATERNAL SCREEN: NORMAL
GLUCOSE BLD MANUAL STRIP-MCNC: 107 MG/DL (ref 74–99)
GLUCOSE BLD MANUAL STRIP-MCNC: 96 MG/DL (ref 74–99)

## 2025-09-03 PROCEDURE — 7100000016 HC LABOR RECOVERY PER HOUR

## 2025-09-03 PROCEDURE — 85461 HEMOGLOBIN FETAL: CPT

## 2025-09-03 PROCEDURE — 1220000001 HC OB SEMI-PRIVATE ROOM DAILY

## 2025-09-03 PROCEDURE — 59409 OBSTETRICAL CARE: CPT | Performed by: OBSTETRICS & GYNECOLOGY

## 2025-09-03 PROCEDURE — 2500000002 HC RX 250 W HCPCS SELF ADMINISTERED DRUGS (ALT 637 FOR MEDICARE OP, ALT 636 FOR OP/ED): Performed by: OBSTETRICS & GYNECOLOGY

## 2025-09-03 PROCEDURE — 36415 COLL VENOUS BLD VENIPUNCTURE: CPT | Performed by: OBSTETRICS & GYNECOLOGY

## 2025-09-03 PROCEDURE — 2500000004 HC RX 250 GENERAL PHARMACY W/ HCPCS (ALT 636 FOR OP/ED): Performed by: OBSTETRICS & GYNECOLOGY

## 2025-09-03 PROCEDURE — 2500000001 HC RX 250 WO HCPCS SELF ADMINISTERED DRUGS (ALT 637 FOR MEDICARE OP): Performed by: OBSTETRICS & GYNECOLOGY

## 2025-09-03 PROCEDURE — 7210000002 HC LABOR PER HOUR

## 2025-09-03 PROCEDURE — 82947 ASSAY GLUCOSE BLOOD QUANT: CPT

## 2025-09-03 RX ORDER — TRANEXAMIC ACID 10 MG/ML
1000 INJECTION, SOLUTION INTRAVENOUS ONCE AS NEEDED
Status: DISCONTINUED | OUTPATIENT
Start: 2025-09-03 | End: 2025-09-04 | Stop reason: HOSPADM

## 2025-09-03 RX ORDER — ADHESIVE BANDAGE
10 BANDAGE TOPICAL
Status: DISCONTINUED | OUTPATIENT
Start: 2025-09-03 | End: 2025-09-04 | Stop reason: HOSPADM

## 2025-09-03 RX ORDER — IBUPROFEN 600 MG/1
600 TABLET, FILM COATED ORAL EVERY 6 HOURS
Status: DISCONTINUED | OUTPATIENT
Start: 2025-09-03 | End: 2025-09-04 | Stop reason: HOSPADM

## 2025-09-03 RX ORDER — DIBUCAINE 1 %
OINTMENT (GRAM) TOPICAL EVERY 6 HOURS PRN
Status: DISCONTINUED | OUTPATIENT
Start: 2025-09-03 | End: 2025-09-04 | Stop reason: HOSPADM

## 2025-09-03 RX ORDER — LABETALOL HYDROCHLORIDE 5 MG/ML
20 INJECTION, SOLUTION INTRAVENOUS ONCE AS NEEDED
Status: DISCONTINUED | OUTPATIENT
Start: 2025-09-03 | End: 2025-09-04 | Stop reason: HOSPADM

## 2025-09-03 RX ORDER — ONDANSETRON HYDROCHLORIDE 2 MG/ML
4 INJECTION, SOLUTION INTRAVENOUS EVERY 6 HOURS PRN
Status: DISCONTINUED | OUTPATIENT
Start: 2025-09-03 | End: 2025-09-04 | Stop reason: HOSPADM

## 2025-09-03 RX ORDER — MISOPROSTOL 200 UG/1
800 TABLET ORAL ONCE AS NEEDED
Status: DISCONTINUED | OUTPATIENT
Start: 2025-09-03 | End: 2025-09-04 | Stop reason: HOSPADM

## 2025-09-03 RX ORDER — OXYTOCIN/0.9 % SODIUM CHLORIDE 30/500 ML
60 PLASTIC BAG, INJECTION (ML) INTRAVENOUS ONCE AS NEEDED
Status: DISCONTINUED | OUTPATIENT
Start: 2025-09-03 | End: 2025-09-04 | Stop reason: HOSPADM

## 2025-09-03 RX ORDER — POLYETHYLENE GLYCOL 3350 17 G/17G
17 POWDER, FOR SOLUTION ORAL 2 TIMES DAILY PRN
Status: DISCONTINUED | OUTPATIENT
Start: 2025-09-03 | End: 2025-09-04 | Stop reason: HOSPADM

## 2025-09-03 RX ORDER — CARBOPROST TROMETHAMINE 250 UG/ML
250 INJECTION, SOLUTION INTRAMUSCULAR ONCE AS NEEDED
Status: DISCONTINUED | OUTPATIENT
Start: 2025-09-03 | End: 2025-09-04 | Stop reason: HOSPADM

## 2025-09-03 RX ORDER — METHYLERGONOVINE MALEATE 0.2 MG/ML
0.2 INJECTION INTRAVENOUS ONCE AS NEEDED
Status: DISCONTINUED | OUTPATIENT
Start: 2025-09-03 | End: 2025-09-04 | Stop reason: HOSPADM

## 2025-09-03 RX ORDER — ONDANSETRON 4 MG/1
4 TABLET, FILM COATED ORAL EVERY 6 HOURS PRN
Status: DISCONTINUED | OUTPATIENT
Start: 2025-09-03 | End: 2025-09-04 | Stop reason: HOSPADM

## 2025-09-03 RX ORDER — OXYTOCIN 10 [USP'U]/ML
10 INJECTION, SOLUTION INTRAMUSCULAR; INTRAVENOUS ONCE AS NEEDED
Status: DISCONTINUED | OUTPATIENT
Start: 2025-09-03 | End: 2025-09-04 | Stop reason: HOSPADM

## 2025-09-03 RX ORDER — DIPHENHYDRAMINE HYDROCHLORIDE 50 MG/ML
25 INJECTION, SOLUTION INTRAMUSCULAR; INTRAVENOUS EVERY 6 HOURS PRN
Status: DISCONTINUED | OUTPATIENT
Start: 2025-09-03 | End: 2025-09-04 | Stop reason: HOSPADM

## 2025-09-03 RX ORDER — SIMETHICONE 80 MG
80 TABLET,CHEWABLE ORAL 4 TIMES DAILY PRN
Status: DISCONTINUED | OUTPATIENT
Start: 2025-09-03 | End: 2025-09-04 | Stop reason: HOSPADM

## 2025-09-03 RX ORDER — DIPHENHYDRAMINE HCL 25 MG
25 TABLET ORAL EVERY 6 HOURS PRN
Status: DISCONTINUED | OUTPATIENT
Start: 2025-09-03 | End: 2025-09-04 | Stop reason: HOSPADM

## 2025-09-03 RX ORDER — ACETAMINOPHEN 325 MG/1
975 TABLET ORAL EVERY 6 HOURS
Status: DISCONTINUED | OUTPATIENT
Start: 2025-09-03 | End: 2025-09-04 | Stop reason: HOSPADM

## 2025-09-03 RX ORDER — LOPERAMIDE HYDROCHLORIDE 2 MG/1
4 CAPSULE ORAL EVERY 2 HOUR PRN
Status: DISCONTINUED | OUTPATIENT
Start: 2025-09-03 | End: 2025-09-04 | Stop reason: HOSPADM

## 2025-09-03 RX ORDER — HYDRALAZINE HYDROCHLORIDE 20 MG/ML
5 INJECTION INTRAMUSCULAR; INTRAVENOUS ONCE AS NEEDED
Status: DISCONTINUED | OUTPATIENT
Start: 2025-09-03 | End: 2025-09-04 | Stop reason: HOSPADM

## 2025-09-03 RX ADMIN — IBUPROFEN 600 MG: 600 TABLET ORAL at 23:42

## 2025-09-03 RX ADMIN — ACETAMINOPHEN 975 MG: 325 TABLET ORAL at 11:25

## 2025-09-03 RX ADMIN — IBUPROFEN 600 MG: 600 TABLET ORAL at 17:28

## 2025-09-03 RX ADMIN — ACETAMINOPHEN 975 MG: 325 TABLET ORAL at 17:28

## 2025-09-03 RX ADMIN — Medication 1 APPLICATION: at 17:29

## 2025-09-03 RX ADMIN — Medication 1 APPLICATION: at 05:20

## 2025-09-03 RX ADMIN — ACETAMINOPHEN 975 MG: 325 TABLET ORAL at 23:42

## 2025-09-03 RX ADMIN — ACETAMINOPHEN 975 MG: 325 TABLET ORAL at 05:16

## 2025-09-03 RX ADMIN — RHO(D) IMMUNE GLOBULIN (HUMAN) 300 MCG: 1500 SOLUTION INTRAMUSCULAR at 11:29

## 2025-09-03 RX ADMIN — SERTRALINE 50 MG: 50 TABLET, FILM COATED ORAL at 09:27

## 2025-09-03 RX ADMIN — IBUPROFEN 600 MG: 600 TABLET ORAL at 05:16

## 2025-09-03 RX ADMIN — Medication 1 APPLICATION: at 12:55

## 2025-09-03 RX ADMIN — IBUPROFEN 600 MG: 600 TABLET ORAL at 11:25

## 2025-09-03 ASSESSMENT — PAIN SCALES - GENERAL
PAINLEVEL_OUTOF10: 0 - NO PAIN
PAINLEVEL_OUTOF10: 2
PAIN_LEVEL: 2
PAINLEVEL_OUTOF10: 3
PAINLEVEL_OUTOF10: 3
PAINLEVEL_OUTOF10: 0 - NO PAIN
PAINLEVEL_OUTOF10: 4
PAINLEVEL_OUTOF10: 2
PAINLEVEL_OUTOF10: 3
PAINLEVEL_OUTOF10: 0 - NO PAIN

## 2025-09-03 ASSESSMENT — PAIN DESCRIPTION - LOCATION
LOCATION: PERINEUM
LOCATION: ABDOMEN
LOCATION: VAGINA

## 2025-09-03 ASSESSMENT — PAIN DESCRIPTION - ORIENTATION: ORIENTATION: LOWER

## 2025-09-03 ASSESSMENT — PAIN DESCRIPTION - DESCRIPTORS
DESCRIPTORS: ACHING
DESCRIPTORS: ACHING;CRAMPING;DISCOMFORT
DESCRIPTORS: ACHING
DESCRIPTORS: CRAMPING

## 2025-09-03 ASSESSMENT — PAIN - FUNCTIONAL ASSESSMENT
PAIN_FUNCTIONAL_ASSESSMENT: 0-10

## 2025-09-04 ENCOUNTER — PHARMACY VISIT (OUTPATIENT)
Dept: PHARMACY | Facility: CLINIC | Age: 30
End: 2025-09-04
Payer: COMMERCIAL

## 2025-09-04 ENCOUNTER — APPOINTMENT (OUTPATIENT)
Dept: OBSTETRICS AND GYNECOLOGY | Facility: CLINIC | Age: 30
End: 2025-09-04
Payer: COMMERCIAL

## 2025-09-04 VITALS
RESPIRATION RATE: 18 BRPM | DIASTOLIC BLOOD PRESSURE: 70 MMHG | BODY MASS INDEX: 39.84 KG/M2 | TEMPERATURE: 97.6 F | WEIGHT: 211 LBS | HEIGHT: 61 IN | OXYGEN SATURATION: 100 % | SYSTOLIC BLOOD PRESSURE: 113 MMHG | HEART RATE: 70 BPM

## 2025-09-04 PROBLEM — Z3A.37 37 WEEKS GESTATION OF PREGNANCY (HHS-HCC): Status: RESOLVED | Noted: 2025-07-23 | Resolved: 2025-09-04

## 2025-09-04 PROBLEM — O24.419 GESTATIONAL DIABETES MELLITUS (GDM) IN THIRD TRIMESTER (HHS-HCC): Status: RESOLVED | Noted: 2025-07-15 | Resolved: 2025-09-04

## 2025-09-04 PROBLEM — Z67.91 RH NEGATIVE STATE IN ANTEPARTUM PERIOD (HHS-HCC): Status: RESOLVED | Noted: 2025-07-23 | Resolved: 2025-09-04

## 2025-09-04 PROBLEM — O09.93 SUPERVISION OF HIGH-RISK PREGNANCY, THIRD TRIMESTER (HHS-HCC): Status: RESOLVED | Noted: 2025-09-02 | Resolved: 2025-09-04

## 2025-09-04 PROBLEM — O26.899 RH NEGATIVE STATE IN ANTEPARTUM PERIOD (HHS-HCC): Status: RESOLVED | Noted: 2025-07-23 | Resolved: 2025-09-04

## 2025-09-04 LAB — GLUCOSE BLD MANUAL STRIP-MCNC: 76 MG/DL (ref 74–99)

## 2025-09-04 PROCEDURE — 82947 ASSAY GLUCOSE BLOOD QUANT: CPT

## 2025-09-04 PROCEDURE — 2500000001 HC RX 250 WO HCPCS SELF ADMINISTERED DRUGS (ALT 637 FOR MEDICARE OP): Performed by: OBSTETRICS & GYNECOLOGY

## 2025-09-04 PROCEDURE — RXMED WILLOW AMBULATORY MEDICATION CHARGE

## 2025-09-04 PROCEDURE — 2500000002 HC RX 250 W HCPCS SELF ADMINISTERED DRUGS (ALT 637 FOR MEDICARE OP, ALT 636 FOR OP/ED): Performed by: OBSTETRICS & GYNECOLOGY

## 2025-09-04 RX ORDER — DIBUCAINE 1 %
OINTMENT (GRAM) TOPICAL 3 TIMES DAILY
Qty: 56 G | Refills: 0 | Status: SHIPPED | OUTPATIENT
Start: 2025-09-04 | End: 2025-10-04

## 2025-09-04 RX ORDER — IBUPROFEN 600 MG/1
600 TABLET, FILM COATED ORAL EVERY 6 HOURS PRN
Qty: 120 TABLET | Refills: 0 | Status: SHIPPED | OUTPATIENT
Start: 2025-09-04 | End: 2025-10-04

## 2025-09-04 RX ORDER — ACETAMINOPHEN 325 MG/1
975 TABLET ORAL EVERY 6 HOURS PRN
COMMUNITY
Start: 2025-09-04

## 2025-09-04 RX ORDER — WITCH HAZEL 50 G/100G
CLOTH TOPICAL AS NEEDED
Qty: 100 EACH | Refills: 0 | Status: SHIPPED | OUTPATIENT
Start: 2025-09-04

## 2025-09-04 RX ADMIN — IBUPROFEN 600 MG: 600 TABLET ORAL at 05:52

## 2025-09-04 RX ADMIN — SERTRALINE 50 MG: 50 TABLET, FILM COATED ORAL at 11:06

## 2025-09-04 RX ADMIN — ACETAMINOPHEN 975 MG: 325 TABLET ORAL at 05:52

## 2025-09-04 RX ADMIN — ACETAMINOPHEN 975 MG: 325 TABLET ORAL at 11:35

## 2025-09-04 RX ADMIN — IBUPROFEN 600 MG: 600 TABLET ORAL at 11:06

## 2025-09-04 ASSESSMENT — PAIN DESCRIPTION - DESCRIPTORS: DESCRIPTORS: ACHING;DISCOMFORT

## 2025-09-04 ASSESSMENT — PAIN - FUNCTIONAL ASSESSMENT: PAIN_FUNCTIONAL_ASSESSMENT: 0-10

## 2025-09-04 ASSESSMENT — PAIN SCALES - GENERAL
PAINLEVEL_OUTOF10: 4
PAINLEVEL_OUTOF10: 3

## 2025-09-04 ASSESSMENT — PAIN DESCRIPTION - LOCATION: LOCATION: PERINEUM

## 2025-09-08 ENCOUNTER — APPOINTMENT (OUTPATIENT)
Dept: OBSTETRICS AND GYNECOLOGY | Facility: CLINIC | Age: 30
End: 2025-09-08
Payer: COMMERCIAL

## 2025-09-08 ENCOUNTER — APPOINTMENT (OUTPATIENT)
Dept: RADIOLOGY | Facility: CLINIC | Age: 30
End: 2025-09-08
Payer: COMMERCIAL

## 2025-09-10 ENCOUNTER — APPOINTMENT (OUTPATIENT)
Dept: BEHAVIORAL HEALTH | Facility: CLINIC | Age: 30
End: 2025-09-10
Payer: COMMERCIAL

## 2025-09-15 ENCOUNTER — APPOINTMENT (OUTPATIENT)
Dept: OBSTETRICS AND GYNECOLOGY | Facility: CLINIC | Age: 30
End: 2025-09-15
Payer: COMMERCIAL

## 2025-09-17 ENCOUNTER — APPOINTMENT (OUTPATIENT)
Dept: OBSTETRICS AND GYNECOLOGY | Facility: CLINIC | Age: 30
End: 2025-09-17
Payer: COMMERCIAL

## 2025-09-18 ENCOUNTER — APPOINTMENT (OUTPATIENT)
Dept: OBSTETRICS AND GYNECOLOGY | Facility: CLINIC | Age: 30
End: 2025-09-18
Payer: COMMERCIAL

## 2025-10-01 ENCOUNTER — APPOINTMENT (OUTPATIENT)
Dept: BEHAVIORAL HEALTH | Facility: CLINIC | Age: 30
End: 2025-10-01
Payer: COMMERCIAL

## 2025-10-16 ENCOUNTER — APPOINTMENT (OUTPATIENT)
Dept: BEHAVIORAL HEALTH | Facility: CLINIC | Age: 30
End: 2025-10-16
Payer: COMMERCIAL